# Patient Record
Sex: FEMALE | Race: BLACK OR AFRICAN AMERICAN | NOT HISPANIC OR LATINO | Employment: OTHER | URBAN - METROPOLITAN AREA
[De-identification: names, ages, dates, MRNs, and addresses within clinical notes are randomized per-mention and may not be internally consistent; named-entity substitution may affect disease eponyms.]

---

## 2017-01-10 ENCOUNTER — HOSPITAL ENCOUNTER (OUTPATIENT)
Dept: WOUND CARE | Facility: HOSPITAL | Age: 67
Discharge: HOME | DRG: 592 | End: 2017-01-10
Attending: FAMILY MEDICINE
Payer: MEDICARE

## 2017-01-10 ENCOUNTER — HOSPITAL ENCOUNTER (INPATIENT)
Facility: HOSPITAL | Age: 67
LOS: 3 days | Discharge: HOME HEALTH | DRG: 592 | End: 2017-01-13
Attending: EMERGENCY MEDICINE | Admitting: INTERNAL MEDICINE
Payer: MEDICARE

## 2017-01-10 DIAGNOSIS — L08.9 INFECTED DECUBITUS ULCER, UNSPECIFIED PRESSURE ULCER STAGE: Primary | ICD-10-CM

## 2017-01-10 DIAGNOSIS — L89.90 INFECTED DECUBITUS ULCER, UNSPECIFIED PRESSURE ULCER STAGE: Primary | ICD-10-CM

## 2017-01-10 DIAGNOSIS — G30.0 ALZHEIMER'S DISEASE WITH EARLY ONSET: ICD-10-CM

## 2017-01-10 DIAGNOSIS — L89.329 PRESSURE ULCER OF LEFT BUTTOCK, UNSPECIFIED STAGE: ICD-10-CM

## 2017-01-10 DIAGNOSIS — F02.80 ALZHEIMER'S DISEASE WITH EARLY ONSET: ICD-10-CM

## 2017-01-10 DIAGNOSIS — L89.314 PRESSURE ULCER OF RIGHT BUTTOCK, STAGE 4: ICD-10-CM

## 2017-01-10 LAB
ALBUMIN SERPL BCP-MCNC: 1.8 G/DL
ALP SERPL-CCNC: 70 U/L
ALT SERPL W/O P-5'-P-CCNC: 23 U/L
ANION GAP SERPL CALC-SCNC: 12 MMOL/L
ANISOCYTOSIS BLD QL SMEAR: SLIGHT
AST SERPL-CCNC: 42 U/L
BASOPHILS # BLD AUTO: 0.02 K/UL
BASOPHILS NFR BLD: 0.2 %
BILIRUB SERPL-MCNC: 0.4 MG/DL
BUN SERPL-MCNC: 13 MG/DL
CALCIUM SERPL-MCNC: 8.7 MG/DL
CHLORIDE SERPL-SCNC: 95 MMOL/L
CO2 SERPL-SCNC: 29 MMOL/L
CREAT SERPL-MCNC: 0.6 MG/DL
DIFFERENTIAL METHOD: ABNORMAL
EOSINOPHIL # BLD AUTO: 0 K/UL
EOSINOPHIL NFR BLD: 0 %
ERYTHROCYTE [DISTWIDTH] IN BLOOD BY AUTOMATED COUNT: 15.6 %
EST. GFR  (AFRICAN AMERICAN): >60 ML/MIN/1.73 M^2
EST. GFR  (NON AFRICAN AMERICAN): >60 ML/MIN/1.73 M^2
GLUCOSE SERPL-MCNC: 74 MG/DL
HCT VFR BLD AUTO: 35.2 %
HGB BLD-MCNC: 11.1 G/DL
LYMPHOCYTES # BLD AUTO: 0.9 K/UL
LYMPHOCYTES NFR BLD: 7 %
MCH RBC QN AUTO: 27.1 PG
MCHC RBC AUTO-ENTMCNC: 31.5 %
MCV RBC AUTO: 86 FL
MONOCYTES # BLD AUTO: 0.9 K/UL
MONOCYTES NFR BLD: 7 %
NEUTROPHILS # BLD AUTO: 10.9 K/UL
NEUTROPHILS NFR BLD: 86.2 %
PLATELET # BLD AUTO: 360 K/UL
PMV BLD AUTO: 9.7 FL
POLYCHROMASIA BLD QL SMEAR: ABNORMAL
POTASSIUM SERPL-SCNC: 4.9 MMOL/L
PROT SERPL-MCNC: 7.6 G/DL
RBC # BLD AUTO: 4.1 M/UL
SODIUM SERPL-SCNC: 136 MMOL/L
WBC # BLD AUTO: 12.65 K/UL

## 2017-01-10 PROCEDURE — 87186 SC STD MICRODIL/AGAR DIL: CPT | Mod: 59

## 2017-01-10 PROCEDURE — 96367 TX/PROPH/DG ADDL SEQ IV INF: CPT

## 2017-01-10 PROCEDURE — 87040 BLOOD CULTURE FOR BACTERIA: CPT

## 2017-01-10 PROCEDURE — 80053 COMPREHEN METABOLIC PANEL: CPT

## 2017-01-10 PROCEDURE — 63600175 PHARM REV CODE 636 W HCPCS: Performed by: EMERGENCY MEDICINE

## 2017-01-10 PROCEDURE — 99285 EMERGENCY DEPT VISIT HI MDM: CPT | Mod: 25,27

## 2017-01-10 PROCEDURE — 87070 CULTURE OTHR SPECIMN AEROBIC: CPT

## 2017-01-10 PROCEDURE — 25000003 PHARM REV CODE 250: Performed by: EMERGENCY MEDICINE

## 2017-01-10 PROCEDURE — 12000002 HC ACUTE/MED SURGE SEMI-PRIVATE ROOM

## 2017-01-10 PROCEDURE — 96366 THER/PROPH/DIAG IV INF ADDON: CPT

## 2017-01-10 PROCEDURE — 85025 COMPLETE CBC W/AUTO DIFF WBC: CPT

## 2017-01-10 PROCEDURE — 96365 THER/PROPH/DIAG IV INF INIT: CPT

## 2017-01-10 PROCEDURE — 99214 OFFICE O/P EST MOD 30 MIN: CPT | Mod: ,,, | Performed by: FAMILY MEDICINE

## 2017-01-10 PROCEDURE — 87077 CULTURE AEROBIC IDENTIFY: CPT | Mod: 59

## 2017-01-10 RX ORDER — SODIUM CHLORIDE 9 MG/ML
INJECTION, SOLUTION INTRAVENOUS CONTINUOUS
Status: DISCONTINUED | OUTPATIENT
Start: 2017-01-10 | End: 2017-01-13 | Stop reason: HOSPADM

## 2017-01-10 RX ORDER — MIRTAZAPINE 15 MG/1
15 TABLET, FILM COATED ORAL NIGHTLY
Status: DISCONTINUED | OUTPATIENT
Start: 2017-01-10 | End: 2017-01-13

## 2017-01-10 RX ORDER — SODIUM CHLORIDE 9 MG/ML
1000 INJECTION, SOLUTION INTRAVENOUS
Status: COMPLETED | OUTPATIENT
Start: 2017-01-10 | End: 2017-01-10

## 2017-01-10 RX ORDER — MEMANTINE HYDROCHLORIDE 10 MG/1
10 TABLET ORAL 2 TIMES DAILY
Status: DISCONTINUED | OUTPATIENT
Start: 2017-01-10 | End: 2017-01-13 | Stop reason: HOSPADM

## 2017-01-10 RX ORDER — LEVETIRACETAM 500 MG/1
500 TABLET ORAL 2 TIMES DAILY
Status: DISCONTINUED | OUTPATIENT
Start: 2017-01-10 | End: 2017-01-11

## 2017-01-10 RX ADMIN — PIPERACILLIN SODIUM AND TAZOBACTAM SODIUM 4.5 G: 4; .5 INJECTION, POWDER, FOR SOLUTION INTRAVENOUS at 09:01

## 2017-01-10 RX ADMIN — LEVETIRACETAM 500 MG: 500 TABLET, FILM COATED ORAL at 11:01

## 2017-01-10 RX ADMIN — MIRTAZAPINE 15 MG: 15 TABLET, FILM COATED ORAL at 11:01

## 2017-01-10 RX ADMIN — SODIUM CHLORIDE 1000 ML: 0.9 INJECTION, SOLUTION INTRAVENOUS at 09:01

## 2017-01-10 RX ADMIN — VANCOMYCIN HYDROCHLORIDE 750 MG: 750 INJECTION, POWDER, LYOPHILIZED, FOR SOLUTION INTRAVENOUS at 09:01

## 2017-01-10 RX ADMIN — SODIUM CHLORIDE: 0.9 INJECTION, SOLUTION INTRAVENOUS at 11:01

## 2017-01-10 RX ADMIN — MEMANTINE 10 MG: 10 TABLET ORAL at 11:01

## 2017-01-10 NOTE — ED TRIAGE NOTES
Pt arrives via EMS from wound care center for wound evaluation for new wound in addition to existing ulcers to sacrum which EMS reports wound care center reported pt may need surgery. Hx of dementia.

## 2017-01-10 NOTE — IP AVS SNAPSHOT
[address] [place] [address]  Phone: [phone]           I have received a copy of my After Visit Summary and discharge instructions from [facility].    INSTRUCTIONS RECEIVED AND UNDERSTOOD BY:                     Patient/Patient Representative: ________________________________________________________________     Date/Time: ________________________________________________________________                     Instructions Given By: ________________________________________________________________     Date/Time: ________________________________________________________________

## 2017-01-10 NOTE — IP AVS SNAPSHOT
[place] [address] [place] [address]  Phone: [phone]           Patient Discharge Instructions     Our goal is to set you up for success. This packet includes information on your condition, medications, and your home care. It will help you to care for yourself so you don't get sicker and need to go back to the hospital.     Please ask your nurse if you have any questions.        There are many details to remember when preparing to leave the hospital. Here is what you will need to do:    1. Take your medicine. If you are prescribed medications, review your Medication List in the following pages. You may have new medications to  at the pharmacy and others that you'll need to stop taking. Review the instructions for how and when to take your medications. Talk with your doctor or nurses if you are unsure of what to do.     2. Go to your follow-up appointments. Specific follow-up information is listed in the following pages. Your may be contacted by a transition nurse or clinical provider about future appointments. Be sure we have all of the phone numbers to reach you, if needed. Please contact your provider's office if you are unable to make an appointment.     3. Watch for warning signs. Your doctor or nurse will give you detailed warning signs to watch for and when to call for assistance. These instructions may also include educational information about your condition. If you experience any of warning signs to your health, call your doctor.               ** Verify the list of medication(s) below is accurate and up to date. Carry this with you in case of emergency. If your medications have changed, please notify your healthcare provider.             Medication List      START taking these medications        Additional Info                      amoxicillin-clavulanate 875-125mg 875-125 mg per tablet   Commonly known as:  AUGMENTIN   Quantity:  28 tablet   Refills:  0    Dose:  1 tablet   Indications:  Skin & soft tissue    Instructions:  1 tablet by Per G Tube route every 12 (twelve) hours.     Begin Date    AM    Noon    PM    Bedtime       collagenase ointment   Quantity:  90 g   Refills:  0    Last time this was given:  1/13/2017 11:28 AM   Instructions:  Apply topically once daily.     Begin Date    AM    Noon    PM    Bedtime       linezolid 600 mg Tab   Commonly known as:  ZYVOX   Quantity:  28 tablet   Refills:  0   Dose:  600 mg   Indications:  Skin & soft tissue    Instructions:  1 tablet (600 mg total) by Per G Tube route every 12 (twelve) hours.     Begin Date    AM    Noon    PM    Bedtime         CONTINUE taking these medications        Additional Info                      donepezil 10 MG tablet   Commonly known as:  ARICEPT   Quantity:  30 tablet   Refills:  5   Dose:  10 mg    Instructions:  Take 1 tablet (10 mg total) by mouth every evening.     Begin Date    AM    Noon    PM    Bedtime       gel base no.41 (bulk) Gel   Quantity:  500 g   Refills:  11   Dose:  1 application   Comments:  Hydrogel or the equivalent    Instructions:  1 application by Misc.(Non-Drug; Combo Route) route 4 (four) times daily as needed.     Begin Date    AM    Noon    PM    Bedtime       levetiracetam 500 MG Tab   Commonly known as:  KEPPRA   Quantity:  60 tablet   Refills:  0   Dose:  500 mg    Last time this was given:  500 mg on 1/13/2017 11:28 AM   Instructions:  Take 1 tablet (500 mg total) by mouth 2 (two) times daily.     Begin Date    AM    Noon    PM    Bedtime       levothyroxine 75 MCG tablet   Commonly known as:  SYNTHROID   Quantity:  30 tablet   Refills:  5   Dose:  75 mcg    Last time this was given:  75 mcg on 1/13/2017  7:23 AM   Instructions:  Take 1 tablet (75 mcg total) by mouth before breakfast.     Begin Date    AM    Noon    PM    Bedtime       memantine 10 MG Tab   Commonly known as:  NAMENDA   Quantity:  60 tablet   Refills:  5   Dose:  10 mg    Last time this  was given:  10 mg on 1/13/2017 11:28 AM   Instructions:  Take 1 tablet (10 mg total) by mouth 2 (two) times daily.     Begin Date    AM    Noon    PM    Bedtime       triamcinolone acetonide 0.5% 0.5 % Crea   Commonly known as:  KENALOG   Quantity:  30 g   Refills:  0    Instructions:  Apply a thin film to affected area twice daily for 7-14 days     Begin Date    AM    Noon    PM    Bedtime         STOP taking these medications     escitalopram oxalate 20 MG tablet   Commonly known as:  LEXAPRO       mirtazapine 15 MG tablet   Commonly known as:  REMERON            Where to Get Your Medications      These medications were sent to [facility] [id number] [place] [address] [address] [facility] & [place] [address] [address] [facility] [address]    Hours:  24-hours Phone:  [phone]     amoxicillin-clavulanate 875-125mg 875-125 mg per tablet    collagenase ointment    linezolid 600 mg Tab         Information about where to get these medications is not yet available     ! Ask your nurse or doctor about these medications     levetiracetam 500 MG Tab                  Please bring to all follow up appointments:    1. A copy of your discharge instructions.  2. All medicines you are currently taking in their original bottles.  3. Identification and insurance card.    Please arrive 15 minutes ahead of scheduled appointment time.    Please call 24 hours in advance if you must reschedule your appointment and/or time.        Your Scheduled Appointments     Jan 20, 2017 10:00 AM [facility]   Hospital Follow Up with Azikiwe K. Lombard, MD   [facility] ([facility]    [address] [place] [id number] [phone]              Follow-up Information     Follow up with Azikiwe K Lombard, MD On 1/20/2017.    Specialty:  Family Medicine    Why:  Outpatient Services, PCP follow-up appointment. Patient should arrive by 10:00AM.     Contact information:    [address]  [place] [address]  [phone]          Follow up with [facility].    Specialties:  DME Provider, Home Health Services    Why:  Home Health    Contact information:    [place] [id number] [phone]          Follow up with [facility] In 1 day.    Why:  Outpatient Services,Palliative Care     Contact information:    [facility]        Discharge Instructions     Future Orders    Activity as tolerated     Diet general     Comments:    NPO. Tube feedings.    Questions:    Total calories:      Fat restriction, if any:      Protein restriction, if any:      Na restriction, if any:      Fluid restriction:      Additional restrictions:          Primary Diagnosis     Your primary diagnosis was:  Bedsore      Admission Information     Date & Time Provider Department CSN    1/10/2017  5:22 PM Danyell Jones MD [facility] [id number]      Care Providers     Provider Role Specialty Primary office phone    Danyell Jones MD Attending Provider Hospitalist [phone]    Gerson Coronado MD Consulting Physician  General Surgery [phone]    Jayne Alejandro MD Consulting Physician  Infectious Diseases [phone]      Important Medicare Message          Most Recent Value    Important Message from Medicare Regarding Discharge Appeal Rights  Given to patient/caregiver, Explained to patient/caregiver, Signed/date by patient/caregiver yes 01/12/2017 1402      Your Vitals Were     BP Pulse Temp Resp    106/59 (BP Location: Left arm, Patient Position: Lying, BP Method: Automatic) 84 98.5 °F (36.9 °C) (Axillary) 18    Height Weight SpO2 BMI    5' 9" (1.753 m) 44.5 kg (98 lb 1.6 oz) 99% 14.49 kg/m2      Recent Lab Values     No lab values to display.      Pending Labs     Order Current Status    Aerobic culture (Specify Source) **CANNOT BE ORDERED AS STAT** Preliminary result    Blood culture Preliminary result    Blood culture Preliminary result    "   Allergies as of 1/13/2017     No Known Allergies      [facility]     [facility] Nurse Care Line - 24/7 Assistance  Unless otherwise directed by your provider, please contact [facility], our nurse care line that is available for 24/7 assistance.     Registered nurses in the [facility] provide clinical advisement, health education, appointment booking, and other advisory services.  Call for this free service at [phone].        Advance Directives     An advance directive is a document which, in the event you are no longer able to make decisions for yourself, tells your healthcare team what kind of treatment you do or do not want to receive, or who you would like to make those decisions for you.  If you do not currently have an advance directive, [facility] encourages you to create one.  For more information call:  [phone]), [phone] ([phone]),  or log on to [email or web address].        Language Assistance Services     ATTENTION: Language assistance services are available, free of charge. Please call [phone].      ATENCIÓN: Si habla español, tiene a gallardo disposición servicios gratuitos de asistencia lingüística. Llame al [phone].     CHÚ Ý: N?u b?n nói Ti?ng Vi?t, có các d?ch v? h? tr? ngôn ng? mi?n phí dành cho b?n. G?i s? [phone].        Chronic Kindey Disease Education             [facility] Sign-Up     Activating your [facility] account is as easy as 1-2-3!     1) Visit [email or web address], select Sign Up Now, enter this activation code and your date of birth, then select Next.  [email or web address]  Expires: 2/25/2017  9:43 AM      2) Create a username and password to use when you visit [facility] in the future and select a security question in case you lose your password and select Next.    3) Enter your e-mail address and click Sign Up!    Additional Information  If you have questions, please e-mail [email or web address] or call [phone]  to talk to our [facility] staff. Remember, [facility] is NOT to be used for urgent needs. For medical emergencies, dial 911.          [facility] complies with applicable Federal civil rights laws and does not discriminate on the basis of race, color, national origin, age, disability, or sex.

## 2017-01-10 NOTE — ED PROVIDER NOTES
Encounter Date: 1/10/2017    SCRIBE #1 NOTE: I, Jose Carlos Cline, am scribing for, and in the presence of,  Srikanth Landeros MD. I have scribed the following portions of the note - Other sections scribed: HPI, ROS.       History     Chief Complaint   Patient presents with    Wound Infection     arrived via [facility], called to wound care for transport to ER for wound evaluation, sacrum wound not healing,      Review of patient's allergies indicates:  No Known Allergies  HPI Comments: CC: Wound Check    HPI: 66 year old female with a history of dementia presents to the ED via EMS accompanied by her daughter who reports the patient has a wound to her sacrum which seems to be getting worse over the last few days. Daughter reports this wound has been draining malodorous purulent discharge for several days. She states the patient was evaluated by urgent care earlier today and the staff was concerned that the patient may need emergent intervention. Daughter otherwise denies fever, chills, abdominal pain, nausea, diarrhea, and any changes in the patient's urinary habits at this time. Symptoms are constant. Of note, history is otherwise limited due to the patient's history of dementia.    The history is provided by the patient, a relative and the EMS personnel.     Past Medical History   Diagnosis Date    Alzheimer disease     Cancer      thyroid    Chronic kidney disease, stage III (moderate)     Dyslipidemia     Essential hypertension, benign     History of thyroid cancer     Hypothyroidism     Obesity     Seizure disorder     Seizures     Unspecified vitamin D deficiency      No past medical history pertinent negatives.  Past Surgical History   Procedure Laterality Date    Thyroidectomy      Gastrostomy tube placement       Family History   Problem Relation Age of Onset    Hypertension Daughter     Alzheimer's disease Mother     Alzheimer's disease       grandmother    Alzheimer's disease       aunt      Social History   Substance Use Topics    Smoking status: Never Smoker    Smokeless tobacco: Never Used    Alcohol use No     Review of Systems   Unable to perform ROS: Psychiatric disorder   Constitutional: Negative for chills and fever.   Eyes: Negative for visual disturbance.   Respiratory: Negative for shortness of breath.    Cardiovascular: Negative for chest pain.   Gastrointestinal: Negative for abdominal pain, diarrhea and vomiting.   Skin: Positive for wound.       Physical Exam   Initial Vitals   BP Pulse Resp Temp SpO2   01/10/17 1650 01/10/17 1650 01/10/17 1650 01/10/17 1650 01/10/17 1650   96/52 94 20 97.3 °F (36.3 °C) 94 %     Physical Exam    Nursing note and vitals reviewed.  Constitutional: She appears well-developed. She is not diaphoretic. No distress.   Cachectic   HENT:   Head: Normocephalic and atraumatic.   Nose: Nose normal.   Mouth/Throat: Oropharynx is clear and moist. No oropharyngeal exudate.   Eyes: Conjunctivae and EOM are normal. Pupils are equal, round, and reactive to light. No scleral icterus.   Neck: Normal range of motion. Neck supple. No thyromegaly present. No tracheal deviation present.   Cardiovascular: Normal rate, regular rhythm and normal heart sounds. Exam reveals no gallop and no friction rub.    No murmur heard.  Pulmonary/Chest: Breath sounds normal. No respiratory distress. She has no wheezes. She has no rhonchi. She has no rales.   Abdominal: Soft. Bowel sounds are normal. She exhibits no distension and no mass. There is no tenderness. There is no rebound and no guarding.   Musculoskeletal: Normal range of motion. She exhibits no edema or tenderness.   Lymphadenopathy:     She has no cervical adenopathy.   Neurological: She is alert. No cranial nerve deficit or sensory deficit.   Skin: Skin is warm and dry. No rash noted. No erythema. No pallor.              ED Course   Procedures  Labs Reviewed   CBC W/ AUTO DIFFERENTIAL - Abnormal; Notable for the following:         Result Value    Hemoglobin 11.1 (*)     Hematocrit 35.2 (*)     MCHC 31.5 (*)     RDW 15.6 (*)     Platelets 360 (*)     Gran # 10.9 (*)     Lymph # 0.9 (*)     Gran% 86.2 (*)     Lymph% 7.0 (*)     All other components within normal limits   COMPREHENSIVE METABOLIC PANEL - Abnormal; Notable for the following:     Albumin 1.8 (*)     AST 42 (*)     All other components within normal limits   CULTURE, AEROBIC  (SPECIFY SOURCE)             Medical Decision Making:   Initial Assessment:   66-year-old female with advanced dementia, chronic kidney disease, high blood pressure, chronic decubitus ulcers brought in by EMS, who were called by the patient's wound care physician, who had concerns for a worsening infected left buttock decubitus ulceration.  EMS reports patient's vital signs and mental status are stable.  Examination reveals several decubitus ulcerations as detailed above, with one clearly infected, foul-smelling, large ulceration to the left buttock that will require debridement and antibiotics.   Differential Diagnosis:   Wound infection.   ED Management:  Patient's laboratory evaluation is significant for borderline elevated white blood cell count with a granulocytic predominance, that is higher than patient's baseline.  She does also have severe hypoalbuminemia.  Given the rapid progression of patient's L buttock decubitus ulceration with abundance of necrotic tissue, foul smell, purulent drainage, her severe hypoalbuminemia, and her borderline elevated white blood cell count I do not believe outpatient management is appropriate and I will admit for surgical debridement, IV antibiotic.             Scribe Attestation:   Scribe #1: I performed the above scribed service and the documentation accurately describes the services I performed. I attest to the accuracy of the note.    Attending Attestation:           Physician Attestation for Scribe:  Physician Attestation Statement for Scribe #1: Srikanth ELIZABETH  MD Tigre, reviewed documentation, as scribed by Jose Carlos Cline in my presence, and it is both accurate and complete.                 ED Course     Clinical Impression:   The encounter diagnosis was Infected decubitus ulcer, unspecified pressure ulcer stage.          Srikanth Landeros III, MD  01/11/17 7286

## 2017-01-11 PROBLEM — R64 CACHEXIA: Status: ACTIVE | Noted: 2017-01-11

## 2017-01-11 LAB
ANION GAP SERPL CALC-SCNC: 10 MMOL/L
BASOPHILS # BLD AUTO: 0.01 K/UL
BASOPHILS NFR BLD: 0.1 %
BUN SERPL-MCNC: 11 MG/DL
CALCIUM SERPL-MCNC: 8.6 MG/DL
CHLORIDE SERPL-SCNC: 101 MMOL/L
CO2 SERPL-SCNC: 25 MMOL/L
CREAT SERPL-MCNC: 0.6 MG/DL
DIFFERENTIAL METHOD: ABNORMAL
EOSINOPHIL # BLD AUTO: 0 K/UL
EOSINOPHIL NFR BLD: 0.1 %
ERYTHROCYTE [DISTWIDTH] IN BLOOD BY AUTOMATED COUNT: 15.7 %
EST. GFR  (AFRICAN AMERICAN): >60 ML/MIN/1.73 M^2
EST. GFR  (NON AFRICAN AMERICAN): >60 ML/MIN/1.73 M^2
GLUCOSE SERPL-MCNC: 55 MG/DL
HCT VFR BLD AUTO: 33 %
HGB BLD-MCNC: 10.3 G/DL
LYMPHOCYTES # BLD AUTO: 1.1 K/UL
LYMPHOCYTES NFR BLD: 7 %
MCH RBC QN AUTO: 27.3 PG
MCHC RBC AUTO-ENTMCNC: 31.2 %
MCV RBC AUTO: 88 FL
MONOCYTES # BLD AUTO: 3 K/UL
MONOCYTES NFR BLD: 19.4 %
NEUTROPHILS # BLD AUTO: 11.2 K/UL
NEUTROPHILS NFR BLD: 73.7 %
PLATELET # BLD AUTO: 408 K/UL
PMV BLD AUTO: 9.7 FL
POTASSIUM SERPL-SCNC: 4.5 MMOL/L
RBC # BLD AUTO: 3.77 M/UL
SODIUM SERPL-SCNC: 136 MMOL/L
WBC # BLD AUTO: 15.22 K/UL

## 2017-01-11 PROCEDURE — 85025 COMPLETE CBC W/AUTO DIFF WBC: CPT

## 2017-01-11 PROCEDURE — 36415 COLL VENOUS BLD VENIPUNCTURE: CPT

## 2017-01-11 PROCEDURE — 12000002 HC ACUTE/MED SURGE SEMI-PRIVATE ROOM

## 2017-01-11 PROCEDURE — 25000003 PHARM REV CODE 250: Performed by: EMERGENCY MEDICINE

## 2017-01-11 PROCEDURE — 25000003 PHARM REV CODE 250: Performed by: HOSPITALIST

## 2017-01-11 PROCEDURE — 63600175 PHARM REV CODE 636 W HCPCS: Performed by: HOSPITALIST

## 2017-01-11 PROCEDURE — 80048 BASIC METABOLIC PNL TOTAL CA: CPT

## 2017-01-11 RX ORDER — ONDANSETRON 2 MG/ML
4 INJECTION INTRAMUSCULAR; INTRAVENOUS EVERY 12 HOURS PRN
Status: DISCONTINUED | OUTPATIENT
Start: 2017-01-11 | End: 2017-01-11

## 2017-01-11 RX ORDER — LEVETIRACETAM 5 MG/ML
500 INJECTION INTRAVASCULAR EVERY 12 HOURS
Status: DISCONTINUED | OUTPATIENT
Start: 2017-01-11 | End: 2017-01-11

## 2017-01-11 RX ORDER — LEVOTHYROXINE SODIUM 75 UG/1
75 TABLET ORAL
Status: DISCONTINUED | OUTPATIENT
Start: 2017-01-11 | End: 2017-01-13 | Stop reason: HOSPADM

## 2017-01-11 RX ORDER — ACETAMINOPHEN 325 MG/1
650 TABLET ORAL EVERY 6 HOURS PRN
Status: DISCONTINUED | OUTPATIENT
Start: 2017-01-11 | End: 2017-01-13 | Stop reason: HOSPADM

## 2017-01-11 RX ORDER — LEVETIRACETAM 500 MG/1
500 TABLET ORAL 2 TIMES DAILY
Status: DISCONTINUED | OUTPATIENT
Start: 2017-01-11 | End: 2017-01-13 | Stop reason: HOSPADM

## 2017-01-11 RX ORDER — ESCITALOPRAM OXALATE 10 MG/1
20 TABLET ORAL DAILY
Status: DISCONTINUED | OUTPATIENT
Start: 2017-01-11 | End: 2017-01-13

## 2017-01-11 RX ADMIN — SODIUM CHLORIDE: 0.9 INJECTION, SOLUTION INTRAVENOUS at 11:01

## 2017-01-11 RX ADMIN — PIPERACILLIN SODIUM AND TAZOBACTAM SODIUM 4.5 G: 4; .5 INJECTION, POWDER, FOR SOLUTION INTRAVENOUS at 05:01

## 2017-01-11 RX ADMIN — ESCITALOPRAM OXALATE 20 MG: 10 TABLET ORAL at 04:01

## 2017-01-11 RX ADMIN — MEMANTINE 10 MG: 10 TABLET ORAL at 04:01

## 2017-01-11 NOTE — CONSULTS
[facility]  Surgery Consult    Subjective:     Reason for consultation: Sacral decubitus    History of Present Illness:   Patient is a 66 y.o. admitted after recent wound care evaluation concern for infection involving chronic sacral decubitus ulcers.  Patient with history of severe dementia associated with Alzheimer's.  History obtained primarily from patient's  present at bedside.  By report the patient is nonverbal and bedbound for extended period of time.  Surgical evaluation is being obtained to evaluate for possible debridement of presumed infected sacral decubitus wound.        Patient Active Problem List   Diagnosis    Malnutrition    Hypothyroidism    Unspecified vitamin D deficiency    Dyslipidemia    Depression    Seizure disorder    History of thyroid cancer    History of thyroidectomy    Dementia of the Alzheimer's type    Decrease in appetite    Hypernatremia    Dehydration    Essential hypertension    Epilepsy    Pressure ulcer of right buttock, stage 4    Multiple open wounds of lower extremity    PEG tube malfunction    Infected decubitus ulcer          No current facility-administered medications on file prior to encounter.      Current Outpatient Prescriptions on File Prior to Encounter   Medication Sig Dispense Refill    escitalopram oxalate (LEXAPRO) 20 MG tablet Take 1 tablet (20 mg total) by mouth once daily. 30 tablet 5    levetiracetam (KEPPRA) 500 MG Tab Take 1 tablet (500 mg total) by mouth 2 (two) times daily. 60 tablet 5    levothyroxine (SYNTHROID) 75 MCG tablet Take 1 tablet (75 mcg total) by mouth before breakfast. 30 tablet 5    memantine (NAMENDA) 10 MG Tab Take 1 tablet (10 mg total) by mouth 2 (two) times daily. 60 tablet 5    mirtazapine (REMERON) 15 MG tablet Take 1 tablet (15 mg total) by mouth every evening. 30 tablet 0    donepezil (ARICEPT) 10 MG tablet Take 1 tablet (10 mg total) by mouth every evening. 30 tablet 5    gel base  no.41, bulk, Gel 1 application by Misc.(Non-Drug; Combo Route) route 4 (four) times daily as needed. 500 g 11    triamcinolone acetonide 0.5% (KENALOG) 0.5 % Crea Apply a thin film to affected area twice daily for 7-14 days 30 g 0        Review of patient's allergies indicates:  No Known Allergies       Past Medical History   Diagnosis Date    Alzheimer disease     Cancer      thyroid    Chronic kidney disease, stage III (moderate)     Dyslipidemia     Essential hypertension, benign     History of thyroid cancer     Hypothyroidism     Seizure disorder     Seizures     Unspecified vitamin D deficiency           Past Surgical History   Procedure Laterality Date    Thyroidectomy      Gastrostomy tube placement            Family History   Problem Relation Age of Onset    Hypertension Daughter     Alzheimer's disease Mother     Alzheimer's disease       grandmother    Alzheimer's disease       aunt          Social History     Social History    Marital status: Legally      Spouse name: N/A    Number of children: N/A    Years of education: N/A     Occupational History    Not on file.     Social History Main Topics    Smoking status: Never Smoker    Smokeless tobacco: Never Used    Alcohol use No    Drug use: No    Sexual activity: Not Currently     Other Topics Concern    Not on file     Social History Narrative         Review of Systems:  Unable to obtain     Physical Exam:    Vitals:    01/11/17 0800   BP: 134/76   Pulse: 93   Resp: 16   Temp: 98.5 °F (36.9 °C)       General: Nonverbal, Frail, cachectic   Respiratory: Respirations are non-labored, Symmetrical chest wall expansion, No chest wall tenderness.   Cardiovascular: Normal rate, Regular rhythm   Gastrointestinal: Soft, Non-tender, Non-distended, PEG tube in place    Musculoskeletal:Bilateral upper and lower extremity contractures.   Integumentary:Pressure sore involving the right ear.  Bilateral buttock sacral decubitus wounds  present with bone palpable.  Right sided wound with beefy red tissue and purulent fluid within the wound bed.  Left buttock wound with foul-smelling black eschar with purulent drainage.  Incontinence of stool near the wounds.       Data Review:  CBC:   Recent Labs  Lab 01/10/17  1955   WBC 12.65   RBC 4.10   HGB 11.1*   HCT 35.2*   *   MCV 86   MCH 27.1   MCHC 31.5*     CMP:   Recent Labs  Lab 01/10/17  1955   GLU 74   CALCIUM 8.7   ALBUMIN 1.8*   PROT 7.6      K 4.9   CO2 29   CL 95   BUN 13   CREATININE 0.6   ALKPHOS 70   ALT 23   AST 42*   BILITOT 0.4     Microbiology Results (last 7 days)     Procedure Component Value Units Date/Time    Blood culture [[id number]] Collected:  01/10/17 1955    Order Status:  Completed Specimen:  Blood from Peripheral, Hand, Left Updated:  01/11/17 0312     Blood Culture, Routine No Growth to date    Blood culture [[id number]] Collected:  01/10/17 1918    Order Status:  Completed Specimen:  Blood from Peripheral, Hand, Left Updated:  01/11/17 0312     Blood Culture, Routine No Growth to date    Aerobic culture (Specify Source) **CANNOT BE ORDERED AS STAT** [[id number]] Collected:  01/10/17 1900    Order Status:  Sent Specimen:  Decubitus from Buttocks, Left Updated:  01/10/17 1912          ASSESSMENT/PLAN:     66-year-old female with severe dementia and chronically bedbound now with progressively worsening bilateral buttock sacral decubitus wounds.  Wounds appear to be likely chronically contaminated with fecal incontinence.  Left buttock eschar present with foul-smelling drainage.  Right-sided eschar with healthier wound appearance but a purulent drainage as well.  Patient severely debilitated and these wounds will never heal despite our best efforts and intentions.  Long discussion with the  present at bedside.  I do not recommend aggressive surgical debridement in this situation given the entire clinical picture.  Strongly suggest they consider hospice  management.  All questions have been answered.  The  will consider their options but likely wish to avoid any aggressive measures.  Case discussed directly with Dr. Jones.

## 2017-01-11 NOTE — PLAN OF CARE
Problem: Pressure Ulcer Risk (Raúl Scale) (Adult,Obstetrics,Pediatric)  Intervention: Promote/Optimize Nutrition    01/11/17 0628   Nutrition Interventions   Oral Nutrition Promotion safe use of adaptive equipment encouraged       Intervention: Prevent/Manage Excess Moisture    01/11/17 0628 01/11/17 1309   Skin Interventions   Skin Protection skin-to-skin areas padded;transparent dressing maintained;tubing/devices free from skin contact --    Hygiene Care   Perineal Care absorbent pad changed;diaper changed;perineum cleansed --    Bathing/Skin Care --  incontinence care       Intervention: Maintain Head of Bed Elevation Less Than 30 Degrees as Tolerated    01/11/17 1210   Positioning   Head of Bed (HOB) HOB at 30 degrees       Intervention: Prevent/Minimize Sheer/Friction Injuries    01/11/17 0628   Skin Interventions   Pressure Reduction Techniques weight shift assistance provided   Positioning   Positioning/Transfer Devices pillows       Intervention: Turn/Reposition Often    01/11/17 0628 01/11/17 1210   Skin Interventions   Pressure Reduction Techniques weight shift assistance provided --    Positioning   Body Position --  side-lying, right         Goal: Identify Related Risk Factors and Signs and Symptoms  Related risk factors and signs and symptoms are identified upon initiation of Human Response Clinical Practice Guideline (CPG)   Outcome: Ongoing (interventions implemented as appropriate)    01/11/17 0628   Pressure Ulcer Risk (Raúl Scale)   Related Risk Factors (Pressure Ulcer Risk (Raúl Scale)) cognitive impairment;infection;mobility impaired;mental impairment;nutritional deficiencies;skeletal deformities;mechanical forces;medical devices       Goal: Skin Integrity  Patient will demonstrate the desired outcomes by discharge/transition of care.   Outcome: Ongoing (interventions implemented as appropriate)    01/11/17 1309   Pressure Ulcer Risk (Raúl Scale) (Adult,Obstetrics,Pediatric)   Skin  Integrity making progress toward outcome         Problem: Infection, Risk/Actual (Adult)  Intervention: Manage Suspected/Actual Infection    01/11/17 1309   Safety Interventions   Infection Management aseptic technique maintained   Prevent/Manage Colorectal Surgical Infection   Fever Reduction/Comfort Measures lightweight bedding       Intervention: Prevent Infection/Maximize Resistance    01/11/17 1309   Hygiene Care   Bathing/Skin Care incontinence care   Respiratory Interventions   Airway/Ventilation Management airway patency maintained;calming measures promoted   Pain/Comfort/Sleep Interventions   Sleep/Rest Enhancement awakenings minimized;family presence promoted;noise level reduced;regular sleep/rest pattern promoted         Goal: Identify Related Risk Factors and Signs and Symptoms  Related risk factors and signs and symptoms are identified upon initiation of Human Response Clinical Practice Guideline (CPG)  Outcome: Ongoing (interventions implemented as appropriate)    01/11/17 1309   Infection, Risk/Actual   Related Risk Factors (Infection, Risk/Actual) chronic illness/condition;malnutrition;skin integrity impairment;tissue perfusion altered   Signs and Symptoms (Infection, Risk/Actual) lab value changes       Goal: Infection Prevention/Resolution  Patient will demonstrate the desired outcomes by discharge/transition of care.  Outcome: Ongoing (interventions implemented as appropriate)    01/11/17 1309   Infection, Risk/Actual (Adult)   Infection Prevention/Resolution making progress toward outcome

## 2017-01-11 NOTE — PLAN OF CARE
01/11/17 1453   Discharge Assessment   Assessment Type Discharge Planning Assessment   Confirmed/corrected address and phone number on facesheet? Yes   Assessment information obtained from? Caregiver   If Healthcare Directive is received, is it scanned into Epic? no (comment)   Prior to hospitilization cognitive status: Unable to Assess   Prior to hospitalization functional status: Completely Dependent   Current cognitive status: Unable to Assess   Current Functional Status: Completely Dependent   Arrived From home or self-care   Lives With child(sushil), adult;grandchild(sushil);spouse   Able to Return to Prior Arrangements yes   Is patient able to care for self after discharge? Yes   How many people do you have in your home that can help with your care after discharge? 3   Who are your caregiver(s) and their phone number(s)? Erin pt daughter [phone]   Patient's perception of discharge disposition home health   Readmission Within The Last 30 Days no previous admission in last 30 days   Patient currently being followed by outpatient case management? No   Patient currently receives home health services? Yes   Does the patient currently use HME? Yes   Patient currently receives private duty nursing? Yes   How many hours of private duty nursing does the patient receive? 28   Patient currently receives any other outside agency services? No   Equipment Currently Used at Home hospital bed  (Per caregiver she was prescribe a arun lit but it could not fit.)   Do you have any problems affording any of your prescribed medications? No   Is the patient taking medications as prescribed? yes   Do you have any financial concerns preventing you from receiving the healthcare you need? No   Does the patient have transportation to healthcare appointments? Yes   Transportation Available family or friend will provide   On Dialysis? No   Does the patient receive services at the Coumadin Clinic? No   Are there any open cases? No    Discharge Plan A Home with family;Home Health   Patient/Family In Agreement With Plan yes     SW informed by pt daughter Erin pt has Neftaly  and receives 32 hrs a week with a sitter from [facility]. Per Erin sitter name is Juana and she has recently started.     [facility] [address] [place] [address] [address] [facility] [place] [address] [place] [id number]  [facility] [place] [address]  Phone: [phone] Fax: [phone]

## 2017-01-11 NOTE — PLAN OF CARE
Problem: Nutrition, Enteral (Adult)  Goal: Signs and Symptoms of Listed Potential Problems Will be Absent, Minimized or Managed (Nutrition, Enteral)  Signs and symptoms of listed potential problems will be absent, minimized or managed by discharge/transition of care (reference Nutrition, Enteral (Adult) CPG).  Recommendations  Recommendation/Intervention: 1.) Recommend Nutren 2.0 @ 10 mls/hr advancing by 10 mls Q6 hrs to goal rate 35 mls/hr continuous to provide 1680 kcals/day, 71 g protein/day, 181 g CHO/day, and 581 mls water/day. Hold TF x4 hrs for residuals >250 mls. Flush 180 mls Q4 hrs to meet fluid needs or per MD.  Or Bolus feed Nutren 2.0 x4 cartons/day (8am, 12pm, 4pm, 8pm) to provide 2000 kcals/day, 84 g protein/day, 216 g CHO/day, and 692 mls water/day. Check for gastric residuals prior to administering feeds and hold x4 hrs for residuals >250 mls. Flush 180 mls Q4 hrs to meet fluid needs per MD.   Goals: 1.) Patient will recieve nutrition in 48 hrs.   Nutrition Goal Status: new  Communication of RD Recs: (sticky note)

## 2017-01-11 NOTE — PROGRESS NOTES
Notified Dr. Jones that pt was not assigned a sx for sx consult. Orders to assign Dr. Coronado noted. Will continue to monitor.

## 2017-01-11 NOTE — ED NOTES
Pt lying in bed resting with eyes closed. Rise and fall of chest noted symmetrically. Family and  currently at bedside. No signs of distress noted at this time. Will cont to monitor pt

## 2017-01-11 NOTE — CONSULTS
[facility]  Adult Nutrition  Consult Note    SUMMARY     Recommendations  Recommendation/Intervention: 1.) Recommend Nutren 2.0 @ 10 mls/hr advancing by 10 mls Q6 hrs to goal rate 35 mls/hr continuous to provide 1680 kcals/day, 71 g protein/day, 181 g CHO/day, and 581 mls water/day. Hold TF x4 hrs for residuals >250 mls. Flush 180 mls Q4 hrs to meet fluid needs or per MD.  Or Bolus feed Nutren 2.0 x4 cartons/day (8am, 12pm, 4pm, 8pm) to provide 2000 kcals/day, 84 g protein/day, 216 g CHO/day, and 692 mls water/day. Check for gastric residuals prior to administering feeds and hold x4 hrs for residuals >250 mls. Flush 180 mls Q4 hrs to meet fluid needs per MD.   Goals: 1.) Patient will recieve nutrition in 48 hrs.   Nutrition Goal Status: new  Communication of RD Recs:  (sticky note)    1. Infected decubitus ulcer, unspecified pressure ulcer stage      Past Medical History   Diagnosis Date    Alzheimer disease     Cancer      thyroid    Chronic kidney disease, stage III (moderate)     Dyslipidemia     Essential hypertension, benign     History of thyroid cancer     Hypothyroidism     Seizure disorder     Seizures     Unspecified vitamin D deficiency        Reason for Assessment  Reason for Assessment: nurse/nurse practitioner consult  General Information Comments: Admits for evaluation of new wound with already existing ulcers.   Per chart, patient nonverbal and disoriented.      Nutrition Prescription Ordered  Current Diet Order: NPO      Evaluation of Received Nutrients/Fluid Intake  IV Fluid (mL): 2400     Nutrition Risk Screen  Nutrition Risk Screen: tube feeding or parenteral nutrition, large or nonhealing wound, burn or pressure ulcer, dysphagia or difficulty swallowing    Nutrition/Diet History  Factors Affecting Nutritional Intake: NPO    Labs/Tests/Procedures/Meds  Diagnostic Test/Procedure Review: reviewed  Pertinent Labs Reviewed: reviewed, pertinent  BMP  Lab Results    Component Value Date     01/10/2017    K 4.9 01/10/2017    CL 95 01/10/2017    CO2 29 01/10/2017    BUN 13 01/10/2017    CREATININE 0.6 01/10/2017    CALCIUM 8.7 01/10/2017    ANIONGAP 12 01/10/2017    ESTGFRAFRICA >60 01/10/2017    EGFRNONAA >60 01/10/2017     Lab Results   Component Value Date    ALBUMIN 1.8 (L) 01/10/2017     Lab Results   Component Value Date    CALCIUM 8.7 01/10/2017    PHOS 1.9 (L) 08/31/2016     No results for input(s): POCTGLUCOSE in the last 24 hours.    Pertinent Medications Reviewed: reviewed  Scheduled Meds:   escitalopram oxalate  20 mg Oral Daily    levetiracetam  500 mg Oral BID    levothyroxine  75 mcg Oral Before breakfast    memantine  10 mg Oral BID    mirtazapine  15 mg Oral QHS    vancomycin (VANCOCIN) IVPB  15 mg/kg Intravenous Q24H     Continuous Infusions:   sodium chloride 0.9% 100 mL/hr at 01/10/17 2310         Physical Findings  Overall Physical Appearance: underweight  Tubes: gastrostomy tube  Skin: pressure ulcer(s) (stage II. Raúl score 8)    Anthropometrics  Height (inches): 69.02 in  Weight Method: Bed Scale  Weight (kg): 44.6 kg  Ideal Body Weight (IBW), Female: 145.1 lb  % Ideal Body Weight, Female (lb): 67.77 lb  BMI (kg/m2): 14.51  BMI Grade: less than 16 protein-energy malnutrition grade III      Estimated/Assessed Needs  Weight Used For Calorie Calculations: 44.6 kg (98 lb 5.2 oz)   Height (cm): 175.3 cm  35 kcal/kg (kcal): 1561 and 40 kcal/kg (kcal): 1784 (weight gain)  RMR ([place]-St. Jeor Equation): 1065.3   Weight Used For Protein Calculations: 44.6 kg (98 lb 5.2 oz)  1.2 gm Protein (gm): 53.63 and 2.0 gm Protein (gm): 89.39 (weight gain)   Fluid Need Method: RDA Method (Or PER MD)   Grams of CHO per day: not required      Malnutrition (Undernutrition) Diagnosis  % Intake of Estimated Energy Needs: 0 - 25%  % Meal Intake: NPO    Nutrition Diagnosis  Nutrition Problem: Inadequate energy intake  Etiology/Related To: decreased ability to  consume sufficient energy  Nutrition Diagnosis Signs/Symptoms As Evidenced By: BMI <16  Nutrition Diagnosis Status: New    Monitor and Evaluation  Food and Nutrient Intake: energy intake, enteral nutrition intake  Food and Nutrient Adminstration: diet order, enteral and parenteral nutrition administration  Anthropometric Measurements: weight, weight change, body mass index  Biochemical Data, Medical Tests and Procedures: electrolyte and renal panel, lipid profile, glucose/endocrine profile  Nutrition-Focused Physical Findings: overall appearance    Nutrition Risk  Level of Risk:  (x2 weekly)     Discharge planning: discharge on peg feeds above.     Nutrition Follow-Up  RD Follow-up?: Yes   1/13/17

## 2017-01-11 NOTE — ED NOTES
SKIN ASSESSMENT:  Left hip: 2 x 2 cm stage 2 pressure ulcer on left hip; mepilex dressing placed  Left buttock: 10 x 12 cm stage 4 pressure ulcer with black necrotic tissue surrounding perimeter; 3 areas of tunneling; 3 4x4 saline soaked gauze dressing packed in tunneled area of wound; non adherent dressing applied on top; large mepelix dressing applied  Right hip/thigh area: 5 x 7 cm stage 4 pressure ulcer; redness; no drainage; 4 cm tunneling; 3 4x4 saline soaked gauzes packed into wound; nonadherent dressing placed on top; large mepilex applied   Left heel: 5 x 4 cm stage 3 pressure ulcer on left heel with epidermis hanging and yellow drainage; nonadherent dressing and mepilex dressing applied  Lateral side of angle of Right foot: 2 x 3 cm area of black necrotic tissue; unstageable; mepilex dressing applied

## 2017-01-11 NOTE — PROGRESS NOTES
Patient has peg tub that is currently clamped. Unable to withdraw gastric contents to check pH and air bolus check was unsuccessful also to confirm placement of tube in the stomach. MD notified, meds held for now.

## 2017-01-11 NOTE — PLAN OF CARE
Problem: Fall Risk (Adult)  Intervention: Monitor/Assist with Self Care    17   Activity   Activity Assistance Provided assistance, 2 people       Intervention: Reduce Risk/Promote Restraint Free Environment    17   Prevent [place] Drop/Fall   Safety/Security Measures bed alarm set   Safety Interventions   Safety Precautions emergency equipment at bedside   Safety Interventions   Environmental Safety Modification room organization consistent;clutter free environment maintained;room near unit station       Intervention: Review Medications/Identify Contributors to Fall Risk    17   Safety Interventions   Medication Review/Management medications reviewed       Intervention: Patient Rounds    17   Safety Interventions   Patient Rounds bed in low position;bed wheels locked;call light in reach;clutter free environment maintained;ID band on;placement of personal items at bedside;toileting offered;visualized patient       Intervention: Safety Promotion/Fall Prevention    17   Safety Interventions   Safety Promotion/Fall Prevention assistive device/personal item within reach;bed alarm set;Fall Risk reviewed with patient/family;lighting adjusted         Goal: Identify Related Risk Factors and Signs and Symptoms  Related risk factors and signs and symptoms are identified upon initiation of Human Response Clinical Practice Guideline (CPG)   Outcome: Ongoing (interventions implemented as appropriate)    17   Fall Risk   Related Risk Factors (Fall Risk) age-related changes;environment unfamiliar;confusion/agitation   Signs and Symptoms (Fall Risk) presence of risk factors       Goal: Absence of Falls  Patient will demonstrate the desired outcomes by discharge/transition of care.   Outcome: Ongoing (interventions implemented as appropriate)    17   Fall Risk (Adult)   Absence of Falls making progress toward outcome         Problem: Pressure Ulcer Risk  (Raúl Scale) (Adult,Obstetrics,Pediatric)  Intervention: Promote/Optimize Nutrition    01/11/17 0628   Nutrition Interventions   Oral Nutrition Promotion safe use of adaptive equipment encouraged       Intervention: Prevent/Manage Excess Moisture    01/11/17 0628   Skin Interventions   Skin Protection skin-to-skin areas padded;transparent dressing maintained;tubing/devices free from skin contact   Hygiene Care   Perineal Care absorbent pad changed;diaper changed;perineum cleansed       Intervention: Maintain Head of Bed Elevation Less Than 30 Degrees as Tolerated    01/11/17 0600   Positioning   Head of Bed (HOB) HOB at 30 degrees       Intervention: Prevent/Minimize Sheer/Friction Injuries    01/11/17 0628   Skin Interventions   Pressure Reduction Techniques weight shift assistance provided   Positioning   Positioning/Transfer Devices pillows         Goal: Identify Related Risk Factors and Signs and Symptoms  Related risk factors and signs and symptoms are identified upon initiation of Human Response Clinical Practice Guideline (CPG)   Outcome: Ongoing (interventions implemented as appropriate)    01/11/17 0628   Pressure Ulcer Risk (Raúl Scale)   Related Risk Factors (Pressure Ulcer Risk (Raúl Scale)) cognitive impairment;infection;mobility impaired;mental impairment;nutritional deficiencies;skeletal deformities;mechanical forces;medical devices       Goal: Skin Integrity  Patient will demonstrate the desired outcomes by discharge/transition of care.   Outcome: Ongoing (interventions implemented as appropriate)    01/11/17 0628   Pressure Ulcer Risk (Raúl Scale) (Adult,Obstetrics,Pediatric)   Skin Integrity making progress toward outcome         Problem: Patient Care Overview  Goal: Plan of Care Review  Outcome: Ongoing (interventions implemented as appropriate)    01/11/17 0628   Coping/Psychosocial   Plan Of Care Reviewed With spouse

## 2017-01-11 NOTE — PHARMACY MED REC
[facility] Medication Reconciliation  Template    Patient was admitted on 1/10/2017 for <principal problem not specified>.      Patient's prior to admission medication regimen was as follows:  Prescriptions Prior to Admission   Medication Sig Dispense Refill Last Dose    escitalopram oxalate (LEXAPRO) 20 MG tablet Take 1 tablet (20 mg total) by mouth once daily. 30 tablet 5 1/9/2017    levetiracetam (KEPPRA) 500 MG Tab Take 1 tablet (500 mg total) by mouth 2 (two) times daily. 60 tablet 5 1/10/2017    levothyroxine (SYNTHROID) 75 MCG tablet Take 1 tablet (75 mcg total) by mouth before breakfast. 30 tablet 5 1/10/2017    memantine (NAMENDA) 10 MG Tab Take 1 tablet (10 mg total) by mouth 2 (two) times daily. 60 tablet 5 1/10/2017    mirtazapine (REMERON) 15 MG tablet Take 1 tablet (15 mg total) by mouth every evening. 30 tablet 0 1/9/2017    donepezil (ARICEPT) 10 MG tablet Take 1 tablet (10 mg total) by mouth every evening. 30 tablet 5 8/29/2016 at Unknown time    gel base no.41, bulk, Gel 1 application by Misc.(Non-Drug; Combo Route) route 4 (four) times daily as needed. 500 g 11     triamcinolone acetonide 0.5% (KENALOG) 0.5 % Crea Apply a thin film to affected area twice daily for 7-14 days 30 g 0 8/28/2016         Please add appropriate    SmartPhrase below:      The home medication history was taken by Guido Dukes Pharm.D..  Based on information gathered and subsequent review by the clinical pharmacist, there were no discrepancies in what has been ordered in EPIC.    Thank you,   Guido Dukes Pharm.D.

## 2017-01-11 NOTE — PROGRESS NOTES
Received patient from ER to room via stretcher. Patient accompanied by transport and spouse. Transferred patient to bed w/ assist. Evaluated general patient appearance and condition. Admit assessment initiated. Pt Medsurg, No tele monitoring initiated. Pt does not respond verbally; pt confused, not oriented to place, self, or situation. Multpile wound dressings in place, C/D/I. Saline lock at RW #24 and NS infusing @ LH # 20 clean, dry, Intact. Pt smells strongly of urine but incontinent diaper in clean, dry, and intact. No apparent distress noted at this time. Pt appears to be resting calmly. Will continue to monitor.

## 2017-01-12 PROBLEM — Z66 DNR (DO NOT RESUSCITATE): Status: ACTIVE | Noted: 2017-01-12

## 2017-01-12 LAB
ANION GAP SERPL CALC-SCNC: 7 MMOL/L
BASOPHILS # BLD AUTO: 0.01 K/UL
BASOPHILS NFR BLD: 0.1 %
BUN SERPL-MCNC: 9 MG/DL
CALCIUM SERPL-MCNC: 8.3 MG/DL
CHLORIDE SERPL-SCNC: 102 MMOL/L
CO2 SERPL-SCNC: 28 MMOL/L
CREAT SERPL-MCNC: 0.6 MG/DL
DIFFERENTIAL METHOD: ABNORMAL
EOSINOPHIL # BLD AUTO: 0 K/UL
EOSINOPHIL NFR BLD: 0.3 %
ERYTHROCYTE [DISTWIDTH] IN BLOOD BY AUTOMATED COUNT: 15.7 %
EST. GFR  (AFRICAN AMERICAN): >60 ML/MIN/1.73 M^2
EST. GFR  (NON AFRICAN AMERICAN): >60 ML/MIN/1.73 M^2
GLUCOSE SERPL-MCNC: 78 MG/DL
HCT VFR BLD AUTO: 31.3 %
HGB BLD-MCNC: 10 G/DL
LYMPHOCYTES # BLD AUTO: 0.9 K/UL
LYMPHOCYTES NFR BLD: 9.2 %
MCH RBC QN AUTO: 27.5 PG
MCHC RBC AUTO-ENTMCNC: 31.9 %
MCV RBC AUTO: 86 FL
MONOCYTES # BLD AUTO: 0.8 K/UL
MONOCYTES NFR BLD: 8 %
NEUTROPHILS # BLD AUTO: 8.2 K/UL
NEUTROPHILS NFR BLD: 82.9 %
PLATELET # BLD AUTO: 492 K/UL
PMV BLD AUTO: 10 FL
POTASSIUM SERPL-SCNC: 4.2 MMOL/L
RBC # BLD AUTO: 3.63 M/UL
SODIUM SERPL-SCNC: 137 MMOL/L
WBC # BLD AUTO: 9.89 K/UL

## 2017-01-12 PROCEDURE — 25000003 PHARM REV CODE 250: Performed by: EMERGENCY MEDICINE

## 2017-01-12 PROCEDURE — 25000003 PHARM REV CODE 250: Performed by: HOSPITALIST

## 2017-01-12 PROCEDURE — 36415 COLL VENOUS BLD VENIPUNCTURE: CPT

## 2017-01-12 PROCEDURE — 63600175 PHARM REV CODE 636 W HCPCS: Performed by: HOSPITALIST

## 2017-01-12 PROCEDURE — 85025 COMPLETE CBC W/AUTO DIFF WBC: CPT

## 2017-01-12 PROCEDURE — 12000002 HC ACUTE/MED SURGE SEMI-PRIVATE ROOM

## 2017-01-12 PROCEDURE — 80048 BASIC METABOLIC PNL TOTAL CA: CPT

## 2017-01-12 PROCEDURE — 25000003 PHARM REV CODE 250: Performed by: INTERNAL MEDICINE

## 2017-01-12 RX ADMIN — COLLAGENASE SANTYL: 250 OINTMENT TOPICAL at 10:01

## 2017-01-12 RX ADMIN — MIRTAZAPINE 15 MG: 15 TABLET, FILM COATED ORAL at 12:01

## 2017-01-12 RX ADMIN — PIPERACILLIN SODIUM AND TAZOBACTAM SODIUM 4.5 G: 4; .5 INJECTION, POWDER, FOR SOLUTION INTRAVENOUS at 12:01

## 2017-01-12 RX ADMIN — PIPERACILLIN SODIUM AND TAZOBACTAM SODIUM 4.5 G: 4; .5 INJECTION, POWDER, FOR SOLUTION INTRAVENOUS at 10:01

## 2017-01-12 RX ADMIN — MEMANTINE 10 MG: 10 TABLET ORAL at 10:01

## 2017-01-12 RX ADMIN — PIPERACILLIN SODIUM AND TAZOBACTAM SODIUM 4.5 G: 4; .5 INJECTION, POWDER, FOR SOLUTION INTRAVENOUS at 05:01

## 2017-01-12 RX ADMIN — MEMANTINE 10 MG: 10 TABLET ORAL at 12:01

## 2017-01-12 RX ADMIN — LEVETIRACETAM 500 MG: 500 TABLET, FILM COATED ORAL at 10:01

## 2017-01-12 RX ADMIN — MIRTAZAPINE 15 MG: 15 TABLET, FILM COATED ORAL at 10:01

## 2017-01-12 RX ADMIN — SODIUM CHLORIDE: 0.9 INJECTION, SOLUTION INTRAVENOUS at 10:01

## 2017-01-12 RX ADMIN — LEVETIRACETAM 500 MG: 500 TABLET, FILM COATED ORAL at 12:01

## 2017-01-12 RX ADMIN — ESCITALOPRAM OXALATE 20 MG: 10 TABLET ORAL at 10:01

## 2017-01-12 NOTE — PROGRESS NOTES
Progress Note    Admit Date: 1/10/2017   LOS: 2 days     SUBJECTIVE:       Nursing report BELLE overnight    OBJECTIVE:       Vital Signs Range (Last 24H):  Temp:  [96.6 °F (35.9 °C)-98.6 °F (37 °C)]   Pulse:  [83-89]   Resp:  [16-17]   BP: ()/(50-74)   SpO2:  [94 %-100 %]     I & O (Last 24H):  Intake/Output Summary (Last 24 hours) at 01/12/17 0957  Last data filed at 01/12/17 0651   Gross per 24 hour   Intake              450 ml   Output                0 ml   Net              450 ml         Physical Exam:  NAD, mental status unchanged    Laboratory:  CBC:   Recent Labs  Lab 01/12/17  0903   WBC 9.89   RBC 3.63*   HGB 10.0*   HCT 31.3*   *   MCV 86   MCH 27.5   MCHC 31.9*     BMP:   Recent Labs  Lab 01/12/17  0903   GLU 78      K 4.2      CO2 28   BUN 9   CREATININE 0.6   CALCIUM 8.3*           ASSESSMENT/PLAN:     Unsalvageable bilateral sacral decubitus ulcers    Extensive conversation with the patient's daughter,  and sister.  I do not think surgery has any role in this situation as this is an unsalvageable situation.  I have recommended transition to palliative care and/or hospice.  Case also d/w wound care team.  Will attempt ongoing local wound care to assist with odor.  Case also d/w palliative care nurse whom will see patient and family today.

## 2017-01-12 NOTE — PROGRESS NOTES
Suction hooked up to wall for pt comfort/safety. NAD noted. Pt resting calmly. Will continue to monitor.

## 2017-01-12 NOTE — SUBJECTIVE & OBJECTIVE
Interval History: Pt with no acute events overnight, tolerating tube feedings.    Review of Systems   Unable to perform ROS: Dementia     Objective:     Vital Signs (Most Recent):  Temp: 97.7 °F (36.5 °C) (01/12/17 1135)  Pulse: 90 (01/12/17 1135)  Resp: 16 (01/12/17 1135)  BP: 127/71 (01/12/17 1135)  SpO2: 97 % (01/12/17 1135) Vital Signs (24h Range):  Temp:  [96.6 °F (35.9 °C)-98 °F (36.7 °C)] 97.7 °F (36.5 °C)  Pulse:  [83-90] 90  Resp:  [16-17] 16  SpO2:  [94 %-100 %] 97 %  BP: ()/(50-74) 127/71     Weight: 44.5 kg (98 lb 1.6 oz)  Body mass index is 14.49 kg/(m^2).    Intake/Output Summary (Last 24 hours) at 01/12/17 1505  Last data filed at 01/12/17 0651   Gross per 24 hour   Intake              450 ml   Output                0 ml   Net              450 ml      Physical Exam   Constitutional:   Cachetic and minimally responsive, non-verbal.   HENT:   Head: Normocephalic and atraumatic.   Eyes: EOM are normal. Pupils are equal, round, and reactive to light.   Neck: Normal range of motion. Neck supple.   Cardiovascular: Normal rate and regular rhythm.    Pulmonary/Chest: Breath sounds normal.   Poor effort.   Abdominal:   Scaphoid, PEG in place, NT, ND, +BS   Musculoskeletal:   Contracted with wasted extremities.   Neurological:   Non-verbal, does not follow commands.   Skin:   Left buttock unstageable; right buttock at stage 4 with foul odor and discharge. Both heels with eschar, both lateral malleolus with unstageable decubituis.       Significant Labs: All pertinent labs within the past 24 hours have been reviewed.    Significant Imaging: I have reviewed and interpreted all pertinent imaging results/findings within the past 24 hours.

## 2017-01-12 NOTE — PLAN OF CARE
Problem: Patient Care Overview  Goal: Plan of Care Review  Outcome: Ongoing (interventions implemented as appropriate)    01/11/17 2000   Coping/Psychosocial   Plan Of Care Reviewed With spouse;daughter;family       No falls this shift. Safety maintained. IVF @ 50 mL/hr and zosyn ivpb scheduled. Tube feedings going @ 20 mL/hr now for goal rate of 30 mL/hr. No residuals. Pt resting calmly and quietly w/ no apparent distress or discomfort observed. Family at bedside. Will continue to monitor.

## 2017-01-12 NOTE — PROGRESS NOTES
PRN wound care done on L and R buttock and L hip wounds. Copious, odorous serosanguinous dranaige noted. Cleansed w/ soap & water and then NS and packed wet to dry then covered w/ mepilex. Pt tolerated well. NAD noted at this time. Will continue to monitor.

## 2017-01-12 NOTE — ASSESSMENT & PLAN NOTE
"Long standing decubiti with possible infection with extremely poor prognosis for healing despite everyone's best efforts. Appreciate ID and surgery input, and I agree that surgical debridement would not be beneficial in this cachetic patient with end stage dementia. I have discussed this with the family and they (daughter with POA) expressed understanding that the patient will not likely heal, but she would still like to pursue surgery for the "comfort" of the patient. Dr. Coronado had another family conference with the family and clarified in detail that the patient is not a surgical candidate. Wound care ordered. Pt is hospice appropriate, but family not ready but will place consult to palliative care to begin education and discussions. Short course of antibiotics as per ID.  "

## 2017-01-12 NOTE — CONSULTS
A 66 year old female readmitted 1/10/17 from home with infected pressure injury; pressure injury Stage 4 right buttock; multiple open wounds lower extremity; dementia of Alzheimer's type; cachexia; hypothyroidism; essential hypertension; epilepsy  PMH: Alzheimer's disease; thyroid cancer; CKD Stage 3; pressure injury Stage 4; dyslipidemia; HTN; hypothyroidism; seizure disorder; Vit D deficiency; S/P PEG  Patient followed in [facility]. [facility] also following.   1/12 WBC 9.89 Hgb 10.0 Hct 31.3     1/10 Alb 1.8  Discussed wounds with Dr. Coronado. Unsalvageable pressure ulcer and will not perform surgical intervention. Palliative Care Nurse to visit again. Palliative Care visited 9/1/16.  On Isoflex mattress; contractures- knees scissoring; incontinent; dependent bed mobility; positioned with foam wedge; wearing Sundance boots  Assessment:  Photodocumentation    Left hip and sacrum- Left hip ulcer unstageable 1 cm(L) 1 cm(W) with slough; Sacrum- Stage 4 10 cm(L) 12 cm(W) 1 cm(D) with necrotic roof. Undermines 2 cm from 11-3 o'clock. Able to feel jagged bone in base of wound. Necrotic roof covering 80% of wound. Large amount foul smelling brown drainage.     Right hip- Stage 4 ulcer 6 cm(L) 4.5 cm(W) 3 cm(D) with 8 cm undermining from 12-3 o'clock. Large amount brown drainage with mildly foul odor. Wound with pink base. Able to feel jagged bone in base of wound.     Right posterior heel and lateral ankle- Unstageable wounds. Heel wound evolving to eschar 6 cm(L) 6 cm(W). Lateral ankle ankle 2 cm(L) 2 cm(W). No drainage or surrounding erythema.     Left posterior heel and lateral ankle- Heel Stage 2- 5 cm(L) 3 cm(W) with pink base and scant serous drainage. Lateral ankle ulcer DTI 1.5 cm(L) 1 cm(W).     Right ear pressure ulcers- Full thickness 4 mm areas of breakdown.   Wounds assessed with Palliative Care nurse and visited with daughter. Discussed treatment plan.   Treatment:  Local wound care to  sacrum with Santyl and NS gauze. Local wound care to bilateral hips and left heel with hydrogel + gauze dressings.   Local wound care to right foot with dry gauze.   Continue Pressure Injury Prevention Interventions with frequent pressure shifts.

## 2017-01-12 NOTE — H&P
[facility] Hospital Medicine  History & Physical    Patient Name: Mercy Pineda  MRN: [id number]  Admission Date: 1/10/2017  Attending Physician: Danyell Jones MD   Primary Care Provider: Azikiwe K Lombard, MD         Patient information was obtained from relative(s), caregiver / friend, past medical records and ER records.     Subjective:     Principal Problem: Decubit infection    Chief Complaint:  Decubiti     HPI: 65 y/o female with end stage Alzheimer, cachexia, chronic stage IV decubiti who was brought in by daughter for foul smelling necrotic decubitus with drainage. Pt has been getting wound care and has a hospital bed with a gel overlay and frequent turning at home. Despite all these measure with tube feedings, her wound continued to to get worse. She is nonverbal, contracted and bed bound at her baseline.    Past Medical History   Diagnosis Date    Alzheimer disease     Cancer      thyroid    Chronic kidney disease, stage III (moderate)     Decubitus ulcer of sacral region, stage 4     Dyslipidemia     Essential hypertension, benign     History of thyroid cancer     Hypothyroidism     Seizure disorder     Seizures     Unspecified vitamin D deficiency        Past Surgical History   Procedure Laterality Date    Thyroidectomy      Gastrostomy tube placement         Review of patient's allergies indicates:  No Known Allergies    No current facility-administered medications on file prior to encounter.      Current Outpatient Prescriptions on File Prior to Encounter   Medication Sig    escitalopram oxalate (LEXAPRO) 20 MG tablet Take 1 tablet (20 mg total) by mouth once daily.    levetiracetam (KEPPRA) 500 MG Tab Take 1 tablet (500 mg total) by mouth 2 (two) times daily.    levothyroxine (SYNTHROID) 75 MCG tablet Take 1 tablet (75 mcg total) by mouth before breakfast.    memantine (NAMENDA) 10 MG Tab Take 1 tablet (10 mg total) by mouth 2 (two) times daily.    mirtazapine  (REMERON) 15 MG tablet Take 1 tablet (15 mg total) by mouth every evening.    donepezil (ARICEPT) 10 MG tablet Take 1 tablet (10 mg total) by mouth every evening.    gel base no.41, bulk, Gel 1 application by Misc.(Non-Drug; Combo Route) route 4 (four) times daily as needed.    triamcinolone acetonide 0.5% (KENALOG) 0.5 % Crea Apply a thin film to affected area twice daily for 7-14 days     Family History     Problem Relation (Age of Onset)    Alzheimer's disease Mother, ,     Hypertension Daughter        Social History Main Topics    Smoking status: Never Smoker    Smokeless tobacco: Never Used    Alcohol use No    Drug use: No    Sexual activity: Not Currently     Review of Systems   Unable to perform ROS: Dementia     Objective:     Vital Signs (Most Recent):  Temp: 96.6 °F (35.9 °C) (01/11/17 1600)  Pulse: 89 (01/11/17 1600)  Resp: 16 (01/11/17 1600)  BP: 127/72 (01/11/17 1600)  SpO2: (!) 94 % (01/11/17 1600) Vital Signs (24h Range):  Temp:  [96.6 °F (35.9 °C)-98.6 °F (37 °C)] 96.6 °F (35.9 °C)  Pulse:  [] 89  Resp:  [16-17] 16  BP: ()/(54-88) 127/72     Weight: 44.6 kg (98 lb 6.4 oz)  Body mass index is 14.53 kg/(m^2).    Physical Exam   Constitutional:   Cachetic and minimally responsive, non-verbal.   HENT:   Head: Normocephalic and atraumatic.   Eyes: EOM are normal. Pupils are equal, round, and reactive to light.   Neck: Normal range of motion. Neck supple.   Cardiovascular: Normal rate and regular rhythm.    Pulmonary/Chest: Breath sounds normal.   Poor effort.   Abdominal:   Scaphoid, PEG in place, NT, ND, +BS   Musculoskeletal:   Contracted with wasted extremities.   Neurological:   Non-verbal, does not follow commands.   Skin:   Left buttock unstageable; right buttock at stage 4 with foul odor and discharge. Both heels with eschar, both lateral malleolus with unstageable decubituis.        Significant Labs: All pertinent labs within the past 24 hours have been  "reviewed.    Significant Imaging: I have reviewed and interpreted all pertinent imaging results/findings within the past 24 hours.    Assessment/Plan:     Infected decubitus ulcer  Long standing decubiti with possible infection with extremely poor prognosis for healing despite everyone's best efforts. Appreciate ID and surgery input, and I agree that surgical debridement would not be beneficial in this cachetic patient with end stage dementia. I have discussed this with the family and they (daughter with POA) expressed understanding that the patient will not likely heal, but she would still like to pursue surgery for the "comfort" of the patient. I told the daughter that the patient will probably not be a surgical candidate given the high risk with minimal benefit to the patient. I have discussed the case with Dr. Coronado and he will discuss this with the family again. Continue course of antibiotics as per ID. Will consider wound care consult after discussions finalized. Family appears not ready for hospice but will consider palliative care consult to begin education process.    Pressure ulcer of right buttock, stage 4  As discussed above, turn Q2, and maximize nutrition.    Multiple open wounds of lower extremity  As discussed above.    Dementia of the Alzheimer's type  End stage and progressive; continue memantine but not likely of any benefit.    Cachexia  Progressive and unlikely to improve despite tube feedings and best efforts.    Hypothyroidism  Continue levothyroxine.    Essential hypertension  Blood pressure normal on no medication, monitor.    Epilepsy  Continue levetiracetam.    DNR status  Discussed with family.    VTE Risk Mitigation         Ordered     Medium Risk of VTE  Once      01/11/17 0143     Place sequential compression device  Until discontinued      01/11/17 0143     Place CALISTA hose  Until discontinued      01/11/17 0143        Danyell Jones MD  Department of Hospital Medicine   [facility]

## 2017-01-12 NOTE — SUBJECTIVE & OBJECTIVE
Past Medical History   Diagnosis Date    Alzheimer disease     Cancer      thyroid    Chronic kidney disease, stage III (moderate)     Decubitus ulcer of sacral region, stage 4     Dyslipidemia     Essential hypertension, benign     History of thyroid cancer     Hypothyroidism     Seizure disorder     Seizures     Unspecified vitamin D deficiency        Past Surgical History   Procedure Laterality Date    Thyroidectomy      Gastrostomy tube placement         Review of patient's allergies indicates:  No Known Allergies    No current facility-administered medications on file prior to encounter.      Current Outpatient Prescriptions on File Prior to Encounter   Medication Sig    escitalopram oxalate (LEXAPRO) 20 MG tablet Take 1 tablet (20 mg total) by mouth once daily.    levetiracetam (KEPPRA) 500 MG Tab Take 1 tablet (500 mg total) by mouth 2 (two) times daily.    levothyroxine (SYNTHROID) 75 MCG tablet Take 1 tablet (75 mcg total) by mouth before breakfast.    memantine (NAMENDA) 10 MG Tab Take 1 tablet (10 mg total) by mouth 2 (two) times daily.    mirtazapine (REMERON) 15 MG tablet Take 1 tablet (15 mg total) by mouth every evening.    donepezil (ARICEPT) 10 MG tablet Take 1 tablet (10 mg total) by mouth every evening.    gel base no.41, bulk, Gel 1 application by Misc.(Non-Drug; Combo Route) route 4 (four) times daily as needed.    triamcinolone acetonide 0.5% (KENALOG) 0.5 % Crea Apply a thin film to affected area twice daily for 7-14 days     Family History     Problem Relation (Age of Onset)    Alzheimer's disease Mother, ,     Hypertension Daughter        Social History Main Topics    Smoking status: Never Smoker    Smokeless tobacco: Never Used    Alcohol use No    Drug use: No    Sexual activity: Not Currently     Review of Systems   Unable to perform ROS: Dementia     Objective:     Vital Signs (Most Recent):  Temp: 96.6 °F (35.9 °C) (01/11/17 1600)  Pulse: 89 (01/11/17  1600)  Resp: 16 (01/11/17 1600)  BP: 127/72 (01/11/17 1600)  SpO2: (!) 94 % (01/11/17 1600) Vital Signs (24h Range):  Temp:  [96.6 °F (35.9 °C)-98.6 °F (37 °C)] 96.6 °F (35.9 °C)  Pulse:  [] 89  Resp:  [16-17] 16  BP: ()/(54-88) 127/72     Weight: 44.6 kg (98 lb 6.4 oz)  Body mass index is 14.53 kg/(m^2).    Physical Exam   Constitutional:   Cachetic and minimally responsive, non-verbal.   HENT:   Head: Normocephalic and atraumatic.   Eyes: EOM are normal. Pupils are equal, round, and reactive to light.   Neck: Normal range of motion. Neck supple.   Cardiovascular: Normal rate and regular rhythm.    Pulmonary/Chest: Breath sounds normal.   Poor effort.   Abdominal:   Scaphoid, PEG in place, NT, ND, +BS   Musculoskeletal:   Contracted with wasted extremities.   Neurological:   Non-verbal, does not follow commands.   Skin:   Left buttock unstageable; right buttock at stage 4 with foul odor and discharge. Both heels with eschar, both lateral malleolus with unstageable decubituis.        Significant Labs: All pertinent labs within the past 24 hours have been reviewed.    Significant Imaging: I have reviewed and interpreted all pertinent imaging results/findings within the past 24 hours.

## 2017-01-12 NOTE — NURSING
Report received from ADE Acevedo. Patient resting comfortably, no complaints, no acute distress noted.  at bedside.

## 2017-01-12 NOTE — PROGRESS NOTES
Handoff report from ADE Salmon. Evaluated general patient appearance/condition. Patient resting quietly, easily aroused per verbal stimuli. Shift assessment completed. No apparent discomfort observed. Pt resting calmly. No apparent distress noted at this time. Will continue to monitor.

## 2017-01-12 NOTE — CONSULTS
Consult Note  Infectious Disease    Consult Requested By: Danyell Jones MD    Reason for Consult: necrotic buttock decubitus    SUBJECTIVE:     History of Present Illness:  Patient is a 66 y.o. female presents with foul smelling necrotic decubitus.she has battled with decubiti despite a hospital bed, a gel overlay and frequent turning. She was found to have a foul odor and was brought to the hospital. She has been declined surgical debridement as very poor surgical risk. She is contracted and bed bound and nonverbal. Family understands futility of situation but wishes to do what they can to help her. They appear not to be ready for hospice.  Past Medical History   Diagnosis Date    Alzheimer disease     Cancer      thyroid    Chronic kidney disease, stage III (moderate)     Dyslipidemia     Essential hypertension, benign     History of thyroid cancer     Hypothyroidism     Seizure disorder     Seizures     Unspecified vitamin D deficiency      Past Surgical History   Procedure Laterality Date    Thyroidectomy      Gastrostomy tube placement       Family History   Problem Relation Age of Onset    Hypertension Daughter     Alzheimer's disease Mother     Alzheimer's disease       grandmother    Alzheimer's disease       aunt     Social History   Substance Use Topics    Smoking status: Never Smoker    Smokeless tobacco: Never Used    Alcohol use No       Review of patient's allergies indicates:  No Known Allergies     Antibiotics     Start     Stop Route Frequency Ordered    01/10/17 2100  vancomycin 750 mg in dextrose 5 % 250 mL IVPB (ready to mix system)      -- IV Every 24 hours (non-standard times) 01/10/17 2054    01/11/17 1730  piperacillin-tazobactam 4.5 g in dextrose 5 % 100 mL IVPB (ready to mix system)      -- IV Every 8 hours (non-standard times) 01/11/17 1629          Review of Systems:  Review of systems not obtained due to patient factors nonverbal.    OBJECTIVE:     Vital Signs (Most  Recent)  Temp: 96.6 °F (35.9 °C) (01/11/17 1600)  Pulse: 89 (01/11/17 1600)  Resp: 16 (01/11/17 1600)  BP: 127/72 (01/11/17 1600)  SpO2: (!) 94 % (01/11/17 1600)    Temperature Range Min/Max (Last 24H):  Temp:  [96.6 °F (35.9 °C)-98.6 °F (37 °C)]     Physical Exam:  General: cachectic  Eyes: conjunctivae/corneas clear. PERRL..  HENT: Head:normocephalic, atraumatic. Ears:not examined. Nose: Nares normal. Septum midline. Mucosa normal. No drainage or sinus tenderness., no discharge. Throat: lips, mucosa, and tongue normal; teeth and gums normal and no throat erythema.  Neck: supple, symmetrical, trachea midline, no JVD and thyroid not enlarged, symmetric, no tenderness/mass/nodules  Lungs:  clear to auscultation bilaterally and normal respiratory effort  Cardiovascular: Heart: regular rate and rhythm, S1, S2 normal, no murmur, click, rub or gallop. Chest Wall: no tenderness. Extremities: contracted. Pulses: not examined.  Abdomen/Rectal: Abdomen: soft, non-tender non-distented; bowel sounds normal; no masses,  no organomegaly. Rectal: not examined  Skin: lt buttock with foul decubitus- 100% slough. rt buttock decubitus istage 4 but clran. both heels with eschar. rt lateral malleolus with unstageable decubituis. lt malleolus, lateral with DTI    Laboratory:  CBC    Recent Labs  Lab 01/11/17  1000   WBC 15.22*   RBC 3.77*   HGB 10.3*   HCT 33.0*   *     BMP    Recent Labs  Lab 01/11/17  1000   CO2 25   BUN 11   CREATININE 0.6   CALCIUM 8.6*     No results for input(s): COLORU, CLARITYU, SPECGRAV, PHUR, PROTEINUA, GLUCOSEU, BILIRUBINCON, BLOODU, WBCU, RBCU, BACTERIA, MUCUS, NITRITE, LEUKOCYTESUR, UROBILINOGEN, HYALINECASTS in the last 168 hours.  Microbiology Results (last 7 days)     Procedure Component Value Units Date/Time    Aerobic culture (Specify Source) **CANNOT BE ORDERED AS STAT** [[id number]] Collected:  01/10/17 1900    Order Status:  Completed Specimen:  Decubitus from Buttocks, Left Updated:  01/11/17  1115     Aerobic Bacterial Culture --     GRAM NEGATIVE AN  Moderate  Identification and susceptibility pending      Blood culture [[id number]] Collected:  01/10/17 1955    Order Status:  Completed Specimen:  Blood from Peripheral, Hand, Left Updated:  01/11/17 0312     Blood Culture, Routine No Growth to date    Blood culture [[id number]] Collected:  01/10/17 1918    Order Status:  Completed Specimen:  Blood from Peripheral, Hand, Left Updated:  01/11/17 0312     Blood Culture, Routine No Growth to date          Diagnostic Results:  Labs: Reviewed  X-Ray: Reviewed    ASSESSMENT/PLAN:     Active Hospital Problems    Diagnosis  POA    Infected decubitus ulcer [L89.90]  Yes      Resolved Hospital Problems    Diagnosis Date Resolved POA   No resolved problems to display.       1. MULTIPLE DECUBITI IN A PATIENT WITH ADVANCED DEMENTIA  LT BUTTOCK IS THE WORST OF THEM. SUGGEST ENZYMATIC DEBRIDEMENT WITH SANTYL AND ASK QING FIELDS TO SEE. SHE MAY BE ABLE TO REMOVE NECROTIC TISSUE AT BEDSIDE  SHE OBVIOUSLY IS DYING SLOWLY  FAMILY UNABLE TO PROCEED TO HOSPICE BECAUSE THEY SEEM UNABLE TO ACCEPT SITUATION  BEING PEG TUBE FED BUT CACHECTIC  SHORT COURSE OF ANTIBIOTICS  PROGNOSIS IS DISMAL

## 2017-01-12 NOTE — ASSESSMENT & PLAN NOTE
"Long standing decubiti with possible infection with extremely poor prognosis for healing despite everyone's best efforts. Appreciate ID and surgery input, and I agree that surgical debridement would not be beneficial in this cachetic patient with end stage dementia. I have discussed this with the family and they (daughter with POA) expressed understanding that the patient will not likely heal, but she would still like to pursue surgery for the "comfort" of the patient. I told the daughter that the patient will probably not be a surgical candidate given the high risk with minimal benefit to the patient. I have discussed the case with Dr. Coronado and he will discuss this with the family again. Continue course of antibiotics as per ID. Will consider wound care consult after discussions finalized. Family appears not ready for hospice but will consider palliative care consult to begin education process.  "

## 2017-01-12 NOTE — PROGRESS NOTES
[facility] Hospital Medicine  Progress Note    Patient Name: Mercy Pineda  MRN: [id number]  Patient Class: IP- Inpatient   Admission Date: 1/10/2017  Length of Stay: 2 days  Attending Physician: Danyell Jones MD  Primary Care Provider: Azikiwe K Lombard, MD        Subjective:     Principal Problem:<principal problem not specified>    HPI:  65 y/o female with end stage Alzheimer, cachexia, chronic stage IV decubiti who was brought in by daughter for foul smelling necrotic decubitus with drainage. Pt has been getting wound care and has a hospital bed with a gel overlay and frequent turning at home. Despite all these measure with tube feedings, her wound continued to to get worse. She is nonverbal, contracted and bed bound at her baseline.    Hospital Course:  65 y/o female with end stage Alzheimer, cachexia, chronic stage IV decubiti admitted for infected decubiti. Pt on course of antibiotics as per ID but overall prognosis poor and wound unsalvageable. Pt is not a surgical candidate and this was finalized by Surgery with family conference today.  Wound care ordered.     Interval History: Pt with no acute events overnight, tolerating tube feedings.    Review of Systems   Unable to perform ROS: Dementia     Objective:     Vital Signs (Most Recent):  Temp: 97.7 °F (36.5 °C) (01/12/17 1135)  Pulse: 90 (01/12/17 1135)  Resp: 16 (01/12/17 1135)  BP: 127/71 (01/12/17 1135)  SpO2: 97 % (01/12/17 1135) Vital Signs (24h Range):  Temp:  [96.6 °F (35.9 °C)-98 °F (36.7 °C)] 97.7 °F (36.5 °C)  Pulse:  [83-90] 90  Resp:  [16-17] 16  SpO2:  [94 %-100 %] 97 %  BP: ()/(50-74) 127/71     Weight: 44.5 kg (98 lb 1.6 oz)  Body mass index is 14.49 kg/(m^2).    Intake/Output Summary (Last 24 hours) at 01/12/17 1505  Last data filed at 01/12/17 0651   Gross per 24 hour   Intake              450 ml   Output                0 ml   Net              450 ml      Physical Exam   Constitutional:   Cachetic and minimally  "responsive, non-verbal.   HENT:   Head: Normocephalic and atraumatic.   Eyes: EOM are normal. Pupils are equal, round, and reactive to light.   Neck: Normal range of motion. Neck supple.   Cardiovascular: Normal rate and regular rhythm.    Pulmonary/Chest: Breath sounds normal.   Poor effort.   Abdominal:   Scaphoid, PEG in place, NT, ND, +BS   Musculoskeletal:   Contracted with wasted extremities.   Neurological:   Non-verbal, does not follow commands.   Skin:   Left buttock unstageable; right buttock at stage 4 with foul odor and discharge. Both heels with eschar, both lateral malleolus with unstageable decubituis.       Significant Labs: All pertinent labs within the past 24 hours have been reviewed.    Significant Imaging: I have reviewed and interpreted all pertinent imaging results/findings within the past 24 hours.    Assessment/Plan:      Infected decubitus ulcer  Long standing decubiti with possible infection with extremely poor prognosis for healing despite everyone's best efforts. Appreciate ID and surgery input, and I agree that surgical debridement would not be beneficial in this cachetic patient with end stage dementia. I have discussed this with the family and they (daughter with POA) expressed understanding that the patient will not likely heal, but she would still like to pursue surgery for the "comfort" of the patient. Dr. Coronado had another family conference with the family and clarified in detail that the patient is not a surgical candidate. Wound care ordered. Pt is hospice appropriate, but family not ready but will place consult to palliative care to begin education and discussions. Short course of antibiotics as per ID.    Pressure ulcer of right buttock, stage 4  As discussed above, turn Q2, and maximize nutrition.      Multiple open wounds of lower extremity  As discussed above.      Dementia of the Alzheimer's type  End stage and progressive; continue memantine but not likely of any " benefit.      Cachexia  Progressive and unlikely to improve despite tube feedings and best efforts.      Epilepsy  Continue levetiracetam.      Essential hypertension  Blood pressure normal on no medication, monitor.      Hypothyroidism  Continue levothyroxine.      DNR (do not resuscitate)  Discussed with family.      VTE Risk Mitigation         Ordered     Medium Risk of VTE  Once      01/11/17 0143     Place sequential compression device  Until discontinued      01/11/17 0143     Place CALISTA hose  Until discontinued      01/11/17 0143          Danyell Jones MD  Department of Hospital Medicine   [facility]

## 2017-01-12 NOTE — CONSULTS
Consult Note  Palliative Care      Consult Requested By: Danyell Jones MD  Reason for Consult:       End stage dementia, cachexia, stage IV decubitus (multiple)    SUBJECTIVE:     History of Present Illness:    CC: Wound Check     HPI: 66 year old female with a history of dementia presents to the ED via EMS accompanied by her daughter who reports the patient has a wound to her sacrum which seems to be getting worse over the last few days. Daughter reports this wound has been draining malodorous purulent discharge for several days. She states the patient was evaluated by urgent care earlier today and the staff was concerned that the patient may need emergent intervention. Daughter otherwise denies fever, chills, abdominal pain, nausea, diarrhea, and any changes in the patient's urinary habits at this time. Symptoms are constant. Of note, history is otherwise limited due to the patient's history of dementia.     The history is provided by the patient, a relative and the EMS personnel.     Patient was admitted for infected decubitus.  Consult was placed to General Surgery; Dr. Coronado's notes indicate wound is unsalvageable and surgery would be of no benefit.  Consult was also placed to I.D., and Dr. Alejandro's note indicate's patient prognosis is dismal.  Wound Care consulted and is following.    Palliative Care has been consulted to discuss goals of care in this patient with end stage dementia, severe decubitus not amenable to surgical intervention, and severe cachexia.      Past Medical History   Diagnosis Date    Alzheimer disease     Cancer      thyroid    Chronic kidney disease, stage III (moderate)     Decubitus ulcer of sacral region, stage 4     Dyslipidemia     Essential hypertension, benign     History of thyroid cancer     Hypothyroidism     Seizure disorder     Seizures     Unspecified vitamin D deficiency      Past Surgical History   Procedure Laterality Date    Thyroidectomy      Gastrostomy  "tube placement       Family History   Problem Relation Age of Onset    Hypertension Daughter     Alzheimer's disease Mother     Alzheimer's disease       grandmother    Alzheimer's disease       aunt     Social History   Substance Use Topics    Smoking status: Never Smoker    Smokeless tobacco: Never Used    Alcohol use No       Mental Status:  Awake with eyes open, nonverbal, does not follow commands.      Palliative Performance Score:  20+  On tube feedings via PEG.        OBJECTIVE:     Pain Assessment/symptom management:  Patient unable to speak or communicate, severe end stage dementia.  Does clench jaw, furrowed brown and makes jerking motions during wound care, also occasional soft moaning sound.  Otherwise cannot obtain any other info on symptomatology.  See MAR for medication regimen.      Decision-Making Capacity:  Patient is unable to speak or communicate in any way on her own behalf.      Advanced Directives:  Living Will:  [place] dated 9/14/16 is valid.    Do Not Resuscitate Status:   DNR    Medical Power of :   NO       Living Arrangements:    Lives at home with family.      Psychosocial, Spiritual, Cultural: Patient unable to communicate, nonverbal, severe end stage dementia.   Patient's most important priorities:  Patient's biggest concerns/fears:  Previous death/end of life care history:  Patient's goals/hopes:      ASSESSMENT/PLAN:     Patient lying on her side, RN's preparing her for wound care nurse.  Patient appears chronically ill and much older than her stated age, severe cachexia with all bony prominences protruding - no fat and no muscle.  HT 5'9" and WT 44.5 kg/98 lbs, BMI 14.5.  She is awake with eyes open, but her head is bent downward so it is difficult to make eye contact - no eye tracking noted.  She is nonverbal.  Body is contracted, but she does have some small spontaneous movements and when holding her hand she will press her thumb (right) against my hand, but does " "not do this consistently upon commands.      Respiratory effort is even and unlabored, lungs are clear and very diminished throughout.  Heart tones are audible, regular.  Abdomen is scaphoid, + bowel sounds.  No peripheral edema noted.  Patient is severely emaciated.  Wound Care nurse Pilar arrived to do wound care on sacrum - very large wound, foul smelling, she commented she could feel jagged bone in the wound, states eschar too adherent to be taken off (see her notes).  Patient gritting her teeth - can see jaw tense with pain, furrowed brow, and jerking movements.  Bedside RN states will provide medication - only Tylenol ordered for pain.  Vital signs:  Afebrile 98.1, HR 84, /56, RR 18, sat 99% on RA.      Subsequent to above, met with patient's daughter Erin Orellana who is known to me from patient's prior admit in August 2016.      Daughter states she has seen the wounds and can state patient's diagnosis and says "I know she can't get any better", but she does not seem to be able to put this into the context of patient's suffering in light of dismal prognosis.  She lacks insight into quality of life issues.   When explaining about failure to thrive and severe malnutrition, she mentions they have been feeding her well (through PEG), and I once again explained malnutrition process that she will continue to have FTT in spite of having adequate TF, and this will make it impossible for wound to heal.  She was able to relay information provided to her from Dr. Coronado as to why surgery not recommended, and wound not curable. Informed her of wound care nurse's findings of jagged bone in the sacral wound.   Also discussed severe end stage dementia.      We discussed goals of care, and she states she wants her mother "to be comfortable."  But at any mention of hospice or comfort measures she immediately says "everyone keeps talking to me about that.  We don't want that and I am tired of hearing about it and it is " "starting to make me mad."  She does not give any specific reason for not wanting hospice, but says they want to continue to have home health and sitters - the services she has now.  She then says there would be no one at home to help care for patient. She wants to continue wound care and is ok for that to be home based now.   She says she does not "see much pain" for her mother.  I explained her expression is severely limited by the dementia - look for jaw tension, furrowed brow, jerking, etc.   I briefly explained hospice philosophy and services to her, and she does acknowledge that it is becoming more burdensome for patient to come to the hospital.  Of interest, she states her grandmother  at home (Alzheimer's) and did not have hospice care.     We discussed alternative of having home Palliative Care visits by NP with [facility] to help with symptom control.  Daughter is agreeable to this.  Provided information about [facility] NP Jami Damon and [facility] NP Evelyn Mcguire.  She is agreeable to either one.      We discussed code status and she reaffirms DNR.  The LaPOST dated 16 is valid.         Ample time was allowed for discussion of concerns.  All questions were asked and answered to her satisfaction.  Emotional support provided.    Plan:  Educate  Literature.  Goals of care and code status discussion.  Support.  Consult .      Recommendations:   Continue supportive care.   Family refuses hospice.   Family agreeable to home visits by Palliative NP.  (Jami Damon/[facility])   Patient may benefit from scheduled pain medication - especially for wound care!   Consult Spiritual Care.  (done)   Palliative Care will follow as needed.    Thank you for this consult and the opportunity to participate in Mrs. Pineda's care again.    Myrna Almeida, BSN, RN, CCRN   Palliative Care Nurse Coordinator   [facility]  [phone]       Time Spent:  > 90 minutes (50% at bedside)    "

## 2017-01-13 VITALS
BODY MASS INDEX: 14.53 KG/M2 | TEMPERATURE: 99 F | RESPIRATION RATE: 18 BRPM | HEART RATE: 84 BPM | WEIGHT: 98.13 LBS | OXYGEN SATURATION: 99 % | SYSTOLIC BLOOD PRESSURE: 106 MMHG | HEIGHT: 69 IN | DIASTOLIC BLOOD PRESSURE: 59 MMHG

## 2017-01-13 PROCEDURE — 25000003 PHARM REV CODE 250: Performed by: EMERGENCY MEDICINE

## 2017-01-13 PROCEDURE — 25000003 PHARM REV CODE 250: Performed by: HOSPITALIST

## 2017-01-13 PROCEDURE — 63600175 PHARM REV CODE 636 W HCPCS: Performed by: HOSPITALIST

## 2017-01-13 RX ORDER — AMOXICILLIN AND CLAVULANATE POTASSIUM 875; 125 MG/1; MG/1
1 TABLET, FILM COATED ORAL EVERY 12 HOURS
Qty: 28 TABLET | Refills: 0 | Status: SHIPPED | OUTPATIENT
Start: 2017-01-13 | End: 2017-01-27

## 2017-01-13 RX ORDER — AMOXICILLIN AND CLAVULANATE POTASSIUM 875; 125 MG/1; MG/1
1 TABLET, FILM COATED ORAL EVERY 12 HOURS
Status: DISCONTINUED | OUTPATIENT
Start: 2017-01-13 | End: 2017-01-13 | Stop reason: HOSPADM

## 2017-01-13 RX ORDER — LINEZOLID 600 MG/1
600 TABLET, FILM COATED ORAL EVERY 12 HOURS
Status: DISCONTINUED | OUTPATIENT
Start: 2017-01-13 | End: 2017-01-13 | Stop reason: HOSPADM

## 2017-01-13 RX ORDER — LEVETIRACETAM 500 MG/1
500 TABLET ORAL 2 TIMES DAILY
Qty: 60 TABLET | Refills: 0
Start: 2017-01-13 | End: 2017-01-27 | Stop reason: SDUPTHER

## 2017-01-13 RX ORDER — LINEZOLID 600 MG/1
600 TABLET, FILM COATED ORAL EVERY 12 HOURS
Qty: 28 TABLET | Refills: 0 | Status: SHIPPED | OUTPATIENT
Start: 2017-01-13 | End: 2017-01-27

## 2017-01-13 RX ADMIN — LEVOTHYROXINE SODIUM 75 MCG: 75 TABLET ORAL at 07:01

## 2017-01-13 RX ADMIN — ESCITALOPRAM OXALATE 20 MG: 10 TABLET ORAL at 11:01

## 2017-01-13 RX ADMIN — LEVETIRACETAM 500 MG: 500 TABLET, FILM COATED ORAL at 11:01

## 2017-01-13 RX ADMIN — PIPERACILLIN SODIUM AND TAZOBACTAM SODIUM 4.5 G: 4; .5 INJECTION, POWDER, FOR SOLUTION INTRAVENOUS at 11:01

## 2017-01-13 RX ADMIN — COLLAGENASE SANTYL: 250 OINTMENT TOPICAL at 11:01

## 2017-01-13 RX ADMIN — MEMANTINE 10 MG: 10 TABLET ORAL at 11:01

## 2017-01-13 RX ADMIN — PIPERACILLIN SODIUM AND TAZOBACTAM SODIUM 4.5 G: 4; .5 INJECTION, POWDER, FOR SOLUTION INTRAVENOUS at 03:01

## 2017-01-13 NOTE — PROGRESS NOTES
PALLIATIVE CARE PROGRESS NOTE:    With permission of patient's daughter Erin Orellana, I called Deanne with [facility] to give patient's name, MRN, and  and diagnosis to NP Jami Damon for home Palliative Care visits for symptom control and continued counseling for hospice care.      Discussed above with BIMAL John.      Myrna Almeida, BSN, RN, CCRN   Palliative Care Nurse Coordinator   [facility]  [phone]

## 2017-01-13 NOTE — NURSING
Report given to ADE Acevedo. Patient resting comfortably, no complaints, no acute distress noted. 12 hour chart check completed

## 2017-01-13 NOTE — NURSING
Report received from ADE Acevedo. Patient resting comfortably, no complaints, no acute distress noted. Family at bedside. Will continue to monitor.

## 2017-01-13 NOTE — PLAN OF CARE
[facility]    HOME HEALTH ORDERS  FACE TO FACE ENCOUNTER    Patient Name: Mercy Pineda  YOB: 1950    PCP: Azikiwe K Lombard, MD   PCP Address: [address] / [id number]  PCP Phone Number: [phone]  PCP Fax: [phone]    Encounter Date: 01/13/2017    Admit to Home Health    Diagnoses:  Active Hospital Problems    Diagnosis  POA    *Infected decubitus ulcer [L89.90]  Yes     Priority: 1 - High    Multiple open wounds of lower extremity [S81.809A]  Yes     Priority: 2     Pressure ulcer of right buttock, stage 4 [L89.314]  Yes     Priority: 2     Dementia of the Alzheimer's type [G30.9, F02.80]  Yes     Priority: 3      Chronic    Cachexia [R64]  Yes     Priority: 4     Epilepsy [G40.909]  Yes     Priority: 5      Chronic    Essential hypertension [I10]  Yes     Priority: 6      Chronic    Hypothyroidism [E03.9]  Yes     Priority: 7      Chronic    DNR (do not resuscitate) [Z66]  Yes     Priority: 10       Resolved Hospital Problems    Diagnosis Date Resolved POA   No resolved problems to display.       No future appointments.  Follow-up Information     Follow up with Azikiwe K Lombard, MD.    Specialty:  Family Medicine    Contact information:    [address] [id number] [phone]          Follow up with [facility].    Specialties:  DME Provider, Home Health Services    Why:  Home Health    Contact information:    [address] [facility]  [place] [id number] [phone]              I have seen and examined this patient face to face today. My clinical findings that support the need for the home health skilled services and home bound status are the following:  Weakness/numbness causing balance and gait disturbance due to Weakness/Debility making it taxing to leave home.    Allergies:Review of patient's allergies indicates:  No Known Allergies    Diet: NPO. All medications and tube feedings per PEG.  Nutren 2.0 @  goal rate 35 mls/hr continuous via PEG. Hold TF x 4 hrs for residuals >250 mls. Flush 180 mls Q4 hrs to meet fluid needs.    Activities: activity as tolerated    Nursing:   SN to complete comprehensive assessment including routine vital signs. Instruct on disease process and s/s of complications to report to MD. Review/verify medication list sent home with the patient at time of discharge  and instruct patient/caregiver as needed. Frequency may be adjusted depending on start of care date.    Notify MD if SBP > 160 or < 90; DBP > 90 or < 50; HR > 120 or < 50; Temp > 101.      CONSULTS:     to evaluate for community resources/long-range planning.  Aide to provide assistance with personal care, ADLs, and vital signs.    MISCELLANEOUS CARE:  PEG Care:  Instruct patient/caregiver to clean site.  Monitor skin integrity.      WOUND CARE ORDERS  Local wound care to sacrum with Santyl and NS gauze. Local wound care to bilateral hips and left heel with hydrogel + gauze dressings. Wound care daily.  Local wound care to right foot with dry gauze daily.   Continue Pressure Injury Prevention Interventions with frequent pressure shifts.        Medications: Review discharge medications with patient and family and provide education.      Current Discharge Medication List      START taking these medications    Details   amoxicillin-clavulanate 875-125mg (AUGMENTIN) 875-125 mg per tablet 1 tablet by Per G Tube route every 12 (twelve) hours.  Qty: 28 tablet, Refills: 0      collagenase ointment Apply topically once daily.  Qty: 90 g, Refills: 0      linezolid (ZYVOX) 600 mg Tab 1 tablet (600 mg total) by Per G Tube route every 12 (twelve) hours.  Qty: 28 tablet, Refills: 0         CONTINUE these medications which have NOT CHANGED    Details              levetiracetam (KEPPRA) 500 MG Tab Take 1 tablet (500 mg total) by mouth 2 (two) times daily.  Qty: 60 tablet, Refills: 5    Associated Diagnoses: Seizure disorder       levothyroxine (SYNTHROID) 75 MCG tablet Take 1 tablet (75 mcg total) by mouth before breakfast.  Qty: 30 tablet, Refills: 5    Associated Diagnoses: Acquired hypothyroidism      memantine (NAMENDA) 10 MG Tab Take 1 tablet (10 mg total) by mouth 2 (two) times daily.  Qty: 60 tablet, Refills: 5      donepezil (ARICEPT) 10 MG tablet Take 1 tablet (10 mg total) by mouth every evening.  Qty: 30 tablet, Refills: 5    Associated Diagnoses: Alzheimer's dementia without behavioral disturbance, Alzheimer's disease of unspecified onset      gel base no.41, bulk, Gel 1 application by Misc.(Non-Drug; Combo Route) route 4 (four) times daily as needed.  Qty: 500 g, Refills: 11    Comments: Hydrogel or the equivalent      triamcinolone acetonide 0.5% (KENALOG) 0.5 % Crea Apply a thin film to affected area twice daily for 7-14 days  Qty: 30 g, Refills: 0    Associated Diagnoses: Rash         STOP taking these medications       mirtazapine (REMERON) 15 MG tablet Comments:   Reason for Stopping:               I certify that this patient is confined to her home and needs intermittent skilled nursing care.

## 2017-01-13 NOTE — PLAN OF CARE
Problem: Fall Risk (Adult)  Intervention: Reduce Risk/Promote Restraint Free Environment    17 1100   Prevent [place] Drop/Fall   Safety/Security Measures bed alarm set --    Safety Interventions   Safety Precautions --  emergency equipment at bedside   Safety Interventions   Environmental Safety Modification room organization consistent;clutter free environment maintained;room near unit station --        Intervention: Review Medications/Identify Contributors to Fall Risk    17   Safety Interventions   Medication Review/Management medications reviewed       Intervention: Patient Rounds    17 1100   Safety Interventions   Patient Rounds bed in low position;bed wheels locked;call light in reach;clutter free environment maintained;ID band on;placement of personal items at bedside;toileting offered;visualized patient       Intervention: Safety Promotion/Fall Prevention    17 1100   Safety Interventions   Safety Promotion/Fall Prevention assistive device/personal item within reach;bed alarm set;Fall Risk reviewed with patient/family;lighting adjusted         Goal: Identify Related Risk Factors and Signs and Symptoms  Related risk factors and signs and symptoms are identified upon initiation of Human Response Clinical Practice Guideline (CPG)   Outcome: Ongoing (interventions implemented as appropriate)    17   Fall Risk   Related Risk Factors (Fall Risk) age-related changes;environment unfamiliar;confusion/agitation   Signs and Symptoms (Fall Risk) presence of risk factors       Goal: Absence of Falls  Patient will demonstrate the desired outcomes by discharge/transition of care.   Outcome: Ongoing (interventions implemented as appropriate)    17   Fall Risk (Adult)   Absence of Falls making progress toward outcome         Problem: Pressure Ulcer Risk (Raúl Scale) (Adult,Obstetrics,Pediatric)  Intervention: Promote/Optimize Nutrition    17 5239    Nutrition Interventions   Oral Nutrition Promotion calorie dense liquids provided;rest periods promoted       Intervention: Prevent/Manage Excess Moisture    01/11/17 0628 01/11/17 1309   Skin Interventions   Skin Protection skin-to-skin areas padded;transparent dressing maintained;tubing/devices free from skin contact --    Hygiene Care   Perineal Care absorbent pad changed;diaper changed;perineum cleansed --    Bathing/Skin Care --  incontinence care       Intervention: Maintain Head of Bed Elevation Less Than 30 Degrees as Tolerated    01/13/17 1100   Positioning   Head of Bed (HOB) HOB at 30-45 degrees       Intervention: Prevent/Minimize Sheer/Friction Injuries    01/11/17 0628 01/12/17 2000   Skin Interventions   Pressure Reduction Techniques weight shift assistance provided --    Positioning   Positioning/Transfer Devices --  wedge;pillows       Intervention: Turn/Reposition Often    01/13/17 1100   Positioning   Body Position side-lying, right         Goal: Identify Related Risk Factors and Signs and Symptoms  Related risk factors and signs and symptoms are identified upon initiation of Human Response Clinical Practice Guideline (CPG)   Outcome: Ongoing (interventions implemented as appropriate)    01/11/17 0628   Pressure Ulcer Risk (Raúl Scale)   Related Risk Factors (Pressure Ulcer Risk (Raúl Scale)) cognitive impairment;infection;mobility impaired;mental impairment;nutritional deficiencies;skeletal deformities;mechanical forces;medical devices       Goal: Skin Integrity  Patient will demonstrate the desired outcomes by discharge/transition of care.     01/13/17 1234   Pressure Ulcer Risk (Raúl Scale) (Adult,Obstetrics,Pediatric)   Skin Integrity unable to achieve outcome

## 2017-01-13 NOTE — PROGRESS NOTES
Handoff report from ADE Castellon. Evaluated general patient appearance/condition. Patient resting quietly, easily aroused per verbal stimuli. Shift assessment completed. No apparent distress noted at this time. Will continue to monitor.

## 2017-01-13 NOTE — PROGRESS NOTES
[facility]  Adult Nutrition  Consult Note    SUMMARY     Recommendations    Recommendation/Intervention:   1. Rec add Arginaid bid via PEG for wound healing aid   2. Consider add Vit C 500 mg bid and 220 mg Zinc Sulfate each x 10 days via PEG to aid with healing   3. Monitor weight weekly  4. RD to monitor  Goals: 1.) Patient will recieve nutrition in 48 hrs.   Nutrition Goal Status: progressing towards goal  Communication of RD Recs:  (sticky note)    Continuum of Care Plan    Referral to Outpatient Services:  (D/C planning: TF to promote healing/weight gain)    Reason for Assessment    Reason for Assessment: RD follow-up  Diagnosis: infection/sepsis (Stage IV wound)  Relevent Medical History: Dementia, cachexia, HTN, HLD, CKD-III, Cancer   Interdisciplinary Rounds: did not attend     General Information Comments: Tolerating TF. Not canidate for surgical debridement. Wound found to have MRSA.     Nutrition Prescription Ordered    Current Diet Order: NPO     Evaluation of Received Nutrients/Fluid Intake    Enteral Calories (kcal): 1680  Enteral Protein (gm): 71  Enteral (Free Water) Fluid (mL): 581  Free Water Flush Fluid (mL): 1080      Energy Calories Required: meeting needs      Protein Required: meeting needs      IV Fluid (mL): 2400      Fluid Required: meeting needs         Nutrition Risk Screen     Nutrition Risk Screen: tube feeding or parenteral nutrition, large or nonhealing wound, burn or pressure ulcer, dysphagia or difficulty swallowing    Nutrition/Diet History    Factors Affecting Nutritional Intake: NPO     Labs/Tests/Procedures/Meds    Diagnostic Test/Procedure Review: reviewed  Pertinent Labs Reviewed: reviewed, pertinent  Pertinent Labs Comments: alb 1.8  Pertinent Medications Reviewed: reviewed  Pertinent Medications Comments: levothyroxine    Physical Findings    Overall Physical Appearance: underweight  Tubes: gastrostomy tube   Skin: pressure ulcer(s) (Stage IV, Stage II x  3, heel x 1, foot x 1)    Anthropometrics     Height (inches): 69.02 in  Weight Method: Bed Scale  Weight (kg): 44.6 kg     Ideal Body Weight (IBW), Female: 145.1 lb     % Ideal Body Weight, Female (lb): 67.77 lb  BMI (kg/m2): 14.51  BMI Grade: less than 16 protein-energy malnutrition grade III     Estimated/Assessed Needs    Weight Used For Calorie Calculations: 44.6 kg (98 lb 5.2 oz)   Height (cm): 175.3 cm      RMR ([place]-St. Jeor Equation): 1065.3      Weight Used For Protein Calculations: 44.6 kg (98 lb 5.2 oz)  Protein Requirements: 60-70 g    Fluid Need Method: RDA Method (Or PER MD)     Malnutrition (Undernutrition) Diagnosis    % Intake of Estimated Energy Needs: 75 - 100%  % Meal Intake: NPO     Nutrition Diagnosis    Nutrition Problem: Inadequate energy intake  Etiology/Related To: decreased ability to consume sufficient energy  Nutrition Diagnosis Signs/Symptoms As Evidenced By: BMI <16  Nutrition Diagnosis Status: New    Monitor and Evaluation    Food and Nutrient Intake: energy intake, enteral nutrition intake  Food and Nutrient Adminstration: diet order, enteral and parenteral nutrition administration   Anthropometric Measurements: weight, weight change, body mass index  Biochemical Data, Medical Tests and Procedures: electrolyte and renal panel, lipid profile, glucose/endocrine profile  Nutrition-Focused Physical Findings: overall appearance    Nutrition Risk    Level of Risk:  (x2 weekly)    Nutrition Follow-Up    RD Follow-up?: Yes

## 2017-01-13 NOTE — NURSING
Report received from ADE Acevedo. Patient resting comfortably, no complaints, no acute distress noted. Will continue to monitor.

## 2017-01-13 NOTE — PLAN OF CARE
01/13/17 1228   Discharge Reassessment   Assessment Type Discharge Planning Reassessment   How does the patient rate their overall health at the present time? Poor   Discharge Plan A Home with family;Home Health   Discharge Plan B Hospice/home   Change in patient condition or support system Yes   Patient choice form signed by patient/caregiver N/A   Explained to the the patient/caregiver why the discharge planned changed: Yes   Involved the patient/caregiver in establishing a new discharge plan: Yes     According to pt family, pt will discharge home with family. Pt will resume [place] [place], and start palliative care with Jesusita Damon.

## 2017-01-13 NOTE — CONSULTS
Consult Note  Infectious Disease    Consult Requested By: Danyell Jones MD    Reason for Consult: necrotic buttock decubitus    SUBJECTIVE:     History of Present Illness:  Patient is a 66 y.o. female presents with foul smelling necrotic decubitus.she has battled with decubiti despite a hospital bed, a gel overlay and frequent turning. She was found to have a foul odor and was brought to the hospital. She has been declined surgical debridement as very poor surgical risk. She is contracted and bed bound and nonverbal. Family understands futility of situation but wishes to do what they can to help her. They appear not to be ready for hospice.  Past Medical History   Diagnosis Date    Alzheimer disease     Cancer      thyroid    Chronic kidney disease, stage III (moderate)     Decubitus ulcer of sacral region, stage 4     Dyslipidemia     Essential hypertension, benign     History of thyroid cancer     Hypothyroidism     Seizure disorder     Seizures     Unspecified vitamin D deficiency      Past Surgical History   Procedure Laterality Date    Thyroidectomy      Gastrostomy tube placement       Family History   Problem Relation Age of Onset    Hypertension Daughter     Alzheimer's disease Mother     Alzheimer's disease       grandmother    Alzheimer's disease       aunt     Social History   Substance Use Topics    Smoking status: Never Smoker    Smokeless tobacco: Never Used    Alcohol use No       Review of patient's allergies indicates:  No Known Allergies     Antibiotics     Start     Stop Route Frequency Ordered    01/11/17 1730  piperacillin-tazobactam 4.5 g in dextrose 5 % 100 mL IVPB (ready to mix system)      -- IV Every 8 hours (non-standard times) 01/11/17 1629          Review of Systems:  Review of systems not obtained due to patient factors nonverbal.    OBJECTIVE:     Vital Signs (Most Recent)  Temp: 98.1 °F (36.7 °C) (01/12/17 1600)  Pulse: 84 (01/12/17 1600)  Resp: 16 (01/12/17  1600)  BP: (!) 120/56 (01/12/17 1600)  SpO2: 99 % (01/12/17 1600)    Temperature Range Min/Max (Last 24H):  Temp:  [97.4 °F (36.3 °C)-98.1 °F (36.7 °C)]     Physical Exam:  General: cachectic  Eyes: conjunctivae/corneas clear. PERRL..  HENT: Head:normocephalic, atraumatic. Ears:not examined. Nose: Nares normal. Septum midline. Mucosa normal. No drainage or sinus tenderness., no discharge. Throat: lips, mucosa, and tongue normal; teeth and gums normal and no throat erythema.  Neck: supple, symmetrical, trachea midline, no JVD and thyroid not enlarged, symmetric, no tenderness/mass/nodules  Lungs:  clear to auscultation bilaterally and normal respiratory effort  Cardiovascular: Heart: regular rate and rhythm, S1, S2 normal, no murmur, click, rub or gallop. Chest Wall: no tenderness. Extremities: contracted. Pulses: not examined.  Abdomen/Rectal: Abdomen: soft, non-tender non-distented; bowel sounds normal; no masses,  no organomegaly. Rectal: not examined  Skin: lt buttock with foul decubitus- 100% slough. rt buttock decubitus istage 4 but clran. both heels with eschar. rt lateral malleolus with unstageable decubituis. lt malleolus, lateral with DTI    Laboratory:  CBC    Recent Labs  Lab 01/12/17  0903   WBC 9.89   RBC 3.63*   HGB 10.0*   HCT 31.3*   *     BMP    Recent Labs  Lab 01/12/17  0903   CO2 28   BUN 9   CREATININE 0.6   CALCIUM 8.3*     No results for input(s): COLORU, CLARITYU, SPECGRAV, PHUR, PROTEINUA, GLUCOSEU, BILIRUBINCON, BLOODU, WBCU, RBCU, BACTERIA, MUCUS, NITRITE, LEUKOCYTESUR, UROBILINOGEN, HYALINECASTS in the last 168 hours.  Microbiology Results (last 7 days)     Procedure Component Value Units Date/Time    Aerobic culture (Specify Source) **CANNOT BE ORDERED AS STAT** [[id number]]  (Susceptibility) Collected:  01/10/17 1900    Order Status:  Completed Specimen:  Decubitus from Buttocks, Left Updated:  01/12/17 0824     Aerobic Bacterial Culture --     GRAM NEGATIVE  AN  Moderate  Identification and susceptibility pending       Aerobic Bacterial Culture --     ESCHERICHIA COLI  Moderate       Aerobic Bacterial Culture --     STAPHYLOCOCCUS AUREUS  Moderate  Susceptibility pending      Blood culture [[id number]] Collected:  01/10/17 1918    Order Status:  Completed Specimen:  Blood from Peripheral, Hand, Left Updated:  01/11/17 2103     Blood Culture, Routine No Growth to date     Blood Culture, Routine No Growth to date    Blood culture [[id number]] Collected:  01/10/17 1955    Order Status:  Completed Specimen:  Blood from Peripheral, Hand, Left Updated:  01/11/17 2103     Blood Culture, Routine No Growth to date     Blood Culture, Routine No Growth to date          Diagnostic Results:  Labs: Reviewed  X-Ray: Reviewed    ASSESSMENT/PLAN:     Active Hospital Problems    Diagnosis  POA    DNR (do not resuscitate) [Z66]  Yes    Cachexia [R64]  Yes    Infected decubitus ulcer [L89.90]  Yes    Multiple open wounds of lower extremity [S81.809A]  Yes    Pressure ulcer of right buttock, stage 4 [L89.314]  Yes    Essential hypertension [I10]  Yes     Chronic    Epilepsy [G40.909]  Yes     Chronic    Dementia of the Alzheimer's type [G30.9, F02.80]  Yes     Chronic    Hypothyroidism [E03.9]  Yes     Chronic      Resolved Hospital Problems    Diagnosis Date Resolved POA   No resolved problems to display.       1. MULTIPLE DECUBITI IN A PATIENT WITH ADVANCED DEMENTIA  LT BUTTOCK IS THE WORST OF THEM. SUGGEST ENZYMATIC DEBRIDEMENT WITH SANTYL AND ASK QING FIELDS TO SEE. SHE MAY BE ABLE TO REMOVE NECROTIC TISSUE AT BEDSIDE  SHE OBVIOUSLY IS DYING SLOWLY  FAMILY UNABLE TO PROCEED TO HOSPICE BECAUSE THEY SEEM UNABLE TO ACCEPT SITUATION  BEING PEG TUBE FED BUT CACHECTIC  SHORT COURSE OF ANTIBIOTICS  PROGNOSIS IS DISMAL  Will have final regimen in am

## 2017-01-13 NOTE — PLAN OF CARE
01/13/17 1359   Final Note   Assessment Type Final Discharge Note   Discharge Disposition Home-Health   What phone number can be called within the next 1-3 days to see how you are doing after discharge? [phone]   Hospital Follow Up  Appt(s) scheduled? Yes   Offered [facility] -- Bedside Delivery? n/a   Discharge/Hospital Encounter Summary to (non-[facility]) PCP No   Referral to Outpatient Case Management complete? No   Referral to / orders for Home Health Complete? Yes   30 day supply of medicines given at discharge, if documented non-compliance / non-adherence? No   Any social issues identified prior to discharge? No   Did you assess the readiness or willingness of the family or caregiver to support self management of care? Yes     BIMAL spoke with Genia with [facility] concerning services provided to pt. Genia e-mailed a request form for palliative care  to BIMAL to have signed by Dr. Jones. Form signed by Dr. Jones and e-mailed back to Omayra Farrell confirmed palliative care services will be provided at discharge.     Orders received for HH. Pt will resume HH with [facility]. BIMAL sent pt face-sheet, orders, and H&P to [facility] via [facility]. BIMAL received e-mail confirming [facility] will provide HH services.    BIMAL arranged transportation via [facility] @ [phone]; BIMAL spoke with Leticia who scheduled transportation; transportation ETA scheduled for 5:00PM.      BIMAL contacted pt daughter Erin at the number listed in chart. BIMAL informed Erin pt will continue with [facility] and [facility] will provide palliative care services. BIMLA informed Gaudencio an e-mail has been sent to [facility] confirming services will resume. BIMAL explained [facility] ETA is 5:00PM and pt scripts has been sent to [facility] and the total cost is 6.10. Erin verbalized understanding. BIMAL provided paperwork to the sitter located in pt room. BIMAL informed pt nurse Cande all CM needs have been met

## 2017-01-13 NOTE — PLAN OF CARE
Problem: Fall Risk (Adult)  Intervention: Reduce Risk/Promote Restraint Free Environment    17   Prevent  Drop/Fall   Safety/Security Measures bed alarm set   Safety Interventions   Environmental Safety Modification room organization consistent;clutter free environment maintained;room near unit station       Intervention: Review Medications/Identify Contributors to Fall Risk    17   Safety Interventions   Medication Review/Management medications reviewed       Intervention: Patient Rounds    17   Safety Interventions   Patient Rounds bed in low position;bed wheels locked;call light in reach;clutter free environment maintained;ID band on;placement of personal items at bedside;toileting offered;visualized patient       Intervention: Safety Promotion/Fall Prevention    17   Safety Interventions   Safety Promotion/Fall Prevention assistive device/personal item within reach;bed alarm set;Fall Risk reviewed with patient/family;lighting adjusted         Goal: Identify Related Risk Factors and Signs and Symptoms  Related risk factors and signs and symptoms are identified upon initiation of Human Response Clinical Practice Guideline (CPG)   Outcome: Ongoing (interventions implemented as appropriate)    17   Fall Risk   Related Risk Factors (Fall Risk) age-related changes;environment unfamiliar;confusion/agitation   Signs and Symptoms (Fall Risk) presence of risk factors       Goal: Absence of Falls  Patient will demonstrate the desired outcomes by discharge/transition of care.   Outcome: Ongoing (interventions implemented as appropriate)    17   Fall Risk (Adult)   Absence of Falls making progress toward outcome

## 2017-01-13 NOTE — PLAN OF CARE
01/12/17 1402   Medicare Message   Important Message from Medicare regarding Discharge Appeal Rights Given to patient/caregiver;Explained to patient/caregiver;Signed/date by patient/caregiver   Date IMM was signed 01/12/17   Time IMM was signed [id number]

## 2017-01-13 NOTE — PROGRESS NOTES
Nursing preparing patient for discharge home. Visited daughter as requested to instruct on use of Santyl. Demonstrated use of Santyl and filling of wound with NS moistened gauze. Noted wound debriding with use of Santyl. Questions answered. Returning home with [facility]  and equipment at home for care of pressure injury.

## 2017-01-13 NOTE — PLAN OF CARE
Culture of decubitus, Left Buttock taken on 1/10/17 is positive for MRSA.  Nursing has initiated Contact Isolation.

## 2017-01-13 NOTE — PROGRESS NOTES
01/13/17 0800   Critical Value Communication   Date Result Received 01/13/17   Time Result Received 0809   Resulting Department of Critical Value lab   Who communicated critical value from resulting department? Rocio Sanderson   Critical Test #1 Decubitis ulcer culture   Critical Test #1 Result MRSA positive   Notified Physician/Designee Dr. Jones   Date Notified 01/13/17   Time Notified 0812   Read Back Verification Yes   Notified MD of decubitus ulcer positive for MRSA. New orders noted.

## 2017-01-13 NOTE — PROGRESS NOTES
Superficial cultures with an e coli, a gnr and an mrsa that is very resistant  rx of these germs are NOT going to result in any miraculous cure  We are making gestures in this poor lady with advanced dementia and horrendous bedsores  Will rx with augmentin and try for linezolid for 2 weeks

## 2017-01-13 NOTE — PROGRESS NOTES
Follow-up Information     Follow up with Azikiwe K Lombard, MD On 1/20/2017.    Specialty:  Family Medicine    Why:  Outpatient Services, PCP follow-up appointment. Patient should arrive by 10:00AM.     Contact information:    [address] [place] [address] [phone]          Follow up with [facility].    Specialties:  DME Provider, Home Health Services    Why:  Home Health    Contact information:    [address] [address]  [phone]          Follow up with [facility] In 1 day.    Why:  Outpatient Services,Palliative Care     Contact information:    [facility]      PLEASE BRING TO ALL FOLLOW UP APPOINTMENTS:   A COPY YOUR DISCHARGE INSTRUCTIONS, Any new MEDICINES YOU ARE CURRENTLY TAKING IN THEIR ORIGINAL BOTTLES  And IDENTIFICATION AND INSURANCE CARD     **PLEASE ARRIVE 15 MINUTES AHEAD OF SCHEDULED APPOINTMENT TIME   ++PLEASE CALL 24 HOURS IN ADVANCE IF YOU MUST RESCHEDULE YOUR APPOINTMENT DAY AND/OR TIME     Thank you for choosing [facility] for your care. Within 48-72 hours after leaving the hospital you will receive a call from [facility] Nurses following up to see how you are doing. The team will ask you a few questions and the call will last approximately 20 minutes.     Please answer any calls you may receive from [facility] we want to continue to support you as you manage your healthcare needs. [facility] is happy to have the opportunity to serve you.    If you have any questions concerning your symptoms:     [facility] On Call Nurse Care Line - 24/7 Assistance  Registered [facility] nurses can provide appointment booking, health education, clinical advisement, and other advisory services.   Call for this free service at [phone].      Again, Thank you for allowing me to help with your discharge planning and choosing [facility] as your healthcare provider.     Dora Hudson, BS, MSW, CSW  [phone]  Care Management

## 2017-01-14 NOTE — NURSING
In preparation for discharge, d/c'ed patient's tele monitor, NSR prior to removal, d/c'ed patient's saline lock, applied pressure to site, secured site with tape and gauze. Discharge instructions given to patient and family at bedside. Family verbalized understanding of instructions. Family states willingness to comply. Saline lock removed. Tele monitoring removed. [facility] at bedside for transport to home. Patient awake, alert. No apparent distress noted.

## 2017-01-14 NOTE — PROGRESS NOTES
1/14/2017 1315  Call received from pt's daughter stating that the zyvox ordered for patient is not covered.  TN spoke with pharmacist.  Zyvox not covered by either medicare or medicaid.  TN spoke with Dr Jones who stated that patient will need medication as soon as able to obtain.  TN will attempt to enroll patient in Zyvox assist program.  Message left for daughterErin at [phone] stating that she should continue to give augmentin and TN will follow up with zyvox.    1/14/2017 1330  Call received from daughter who verbalized understanding of above    1/16/17  TN spoke with Rep at Zyvox assist.  Pt approved for Zyvox x 14days.  ID [id number]  BIN [id number]  Group [id number]  TN called pharmacy.  Pharmacist was able to fill using above info.    TN called daughterGaudencio and notified her med ready for .

## 2017-01-14 NOTE — DISCHARGE SUMMARY
[facility] Hospital Medicine  Discharge Summary      Patient Name: Mercy Pineda  MRN: [id number]  Admission Date: 1/10/2017  Hospital Length of Stay: 3 days  Discharge Date: 1/13/2017   Attending Physician: Danyell Jones MD  Discharging Provider: Danyell Jones MD  Primary Care Provider: Azikiwe K Lombard, MD      HPI:   67 y/o female with end stage Alzheimer, cachexia, chronic stage IV decubiti who was brought in by daughter for foul smelling necrotic decubitus with drainage. Pt has been getting wound care and has a hospital bed with a gel overlay and frequent turning at home. Despite all these measure with tube feedings, her wound continued to to get worse. She is nonverbal, contracted and bed bound at her baseline.    * No surgery found *      Indwelling Lines/Drains at time of discharge:   Lines/Drains/Airways     Drain                 Gastrostomy/Enterostomy 09/02/16 1441 Percutaneous endoscopic gastrostomy (PEG) LUQ feeding 133 days         Gastrostomy/Enterostomy 12/09/16 1313 Percutaneous endoscopic gastrostomy (PEG) midline 35 days          Pressure Ulcer                 Pressure Ulcer 08/29/16 2300 Left buttocks Stage  days         Pressure Ulcer 08/29/16 2300 Right hip Stage I 136 days         Pressure Ulcer 08/30/16 0800 Left ischial tuberosity Stage  days         Pressure Ulcer 01/10/17 0200 Left hip Unstageable 3 days         Pressure Ulcer 01/10/17 0200 Right  Stage IV 3 days         Pressure Ulcer 01/10/17 0837 Left lateral Stage I 3 days         Pressure Ulcer 01/10/17 0846 Right lateral other (see comments) suspected deep tissue injury 3 days         Pressure Ulcer 01/10/17 0856 Right ear, right Stage I 3 days         Pressure Ulcer 01/11/17 0200 Left heel 2 days         Pressure Ulcer 01/11/17 0200 Right lateral foot 2 days              Hospital Course:   67 y/o female with end stage Alzheimer, cachexia, chronic stage IV decubiti admitted for infected decubiti. Pt  was on empiric antibiotics as per ID, but overall prognosis poor and wound is unsalvageable. Pt was seen by surgery, and she is not a surgical candidate. Pt was seen by wound care and antibiotics adjusted to Augmentin and Zyvox by ID for 2 week course for infection due to MRSA and E. coli. Palliative care followed given patient is hospice appropriate, but family was not ready for hospice, but were agreeable for palliative care nurse to follow as outpatient along with home health for wound care. Overall, patient will not recover and is slowly dying and this was conveyed to the family by the whole care team. Pt is DNR and LaPOST is in the chart. Hopefully, family will be agreeable for hospice at some point.     Consults:   Consults         Status Ordering Provider     Inpatient consult to General surgery  Once     Provider:  Gerson Coronado MD    Completed ELENA GILL III     Inpatient consult to Infectious Diseases  Once     Provider:  Jayne Alejandro MD    Completed JACKELYN DUNBAR     Inpatient consult to Palliative Care  Once     Provider:  (Not yet assigned)    Completed JACKELYN DNUBAR     IP consult to dietary  Once     Provider:  (Not yet assigned)    Completed JACKELYN DUNBAR          Significant Diagnostic Studies: Wound culture with MRSA and E.coli    Pending Diagnostic Studies:     None        Final Active Diagnoses:    Diagnosis Date Noted POA    PRINCIPAL PROBLEM:  Infected decubitus ulcer [L89.90] 01/10/2017 Yes    Multiple open wounds of lower extremity [S81.809A] 12/08/2016 Yes    Pressure ulcer of right buttock, stage 4 [L89.314]  Yes    Dementia of the Alzheimer's type [G30.9, F02.80] 11/06/2012 Yes     Chronic    Cachexia [R64] 01/11/2017 Yes    Epilepsy [G40.909] 08/30/2016 Yes     Chronic    Essential hypertension [I10] 08/30/2016 Yes     Chronic    Hypothyroidism [E03.9] 10/18/2012 Yes     Chronic    DNR (do not resuscitate) [Z66] 01/12/2017 Yes      Problems Resolved During this  Admission:    Diagnosis Date Noted Date Resolved POA        Discharged Condition: poor    Disposition: Home-Health Care [facility]    Follow Up:  Follow-up Information     Follow up with Azikiwe K Lombard, MD On 1/20/2017.    Specialty:  Family Medicine    Why:  Outpatient Services, PCP follow-up appointment. Patient should arrive by 10:00AM.     Contact information:    [address] [place] [address] [phone]          Follow up with [facility].    Specialties:  DME Provider, Home Health Services    Why:  Home Health    Contact information:    [address] [address]  [phone]          Follow up with [facility] In 1 day.    Why:  Outpatient Services,Palliative Care     Contact information:    [facility]        Patient Instructions:     Diet general   Order Comments: NPO. Tube feedings.     Activity as tolerated       Medications:  Reconciled Home Medications:   Discharge Medication List as of 1/13/2017  5:07 PM      START taking these medications    Details   amoxicillin-clavulanate 875-125mg (AUGMENTIN) 875-125 mg per tablet 1 tablet by Per G Tube route every 12 (twelve) hours., Starting 1/13/2017, Until Fri 1/27/17, Normal      collagenase ointment Apply topically once daily., Starting 1/13/2017, Until Discontinued, Normal      linezolid (ZYVOX) 600 mg Tab 1 tablet (600 mg total) by Per G Tube route every 12 (twelve) hours., Starting 1/13/2017, Until Fri 1/27/17, Normal         CONTINUE these medications which have CHANGED    Details   levetiracetam (KEPPRA) 500 MG Tab Take 1 tablet (500 mg total) by mouth 2 (two) times daily., Starting 1/13/2017, Until Sat 1/13/18, No Print         CONTINUE these medications which have NOT CHANGED    Details   levothyroxine (SYNTHROID) 75 MCG tablet Take 1 tablet (75 mcg total) by mouth before breakfast., Starting 6/23/2016, Until Discontinued, Normal      memantine (NAMENDA) 10 MG Tab Take 1 tablet (10 mg total)  by mouth 2 (two) times daily., Starting 10/25/2016, Until Discontinued, Normal      donepezil (ARICEPT) 10 MG tablet Take 1 tablet (10 mg total) by mouth every evening., Starting 6/23/2016, Until Discontinued, Normal      gel base no.41, bulk, Gel 1 application by Misc.(Non-Drug; Combo Route) route 4 (four) times daily as needed., Starting 11/16/2016, Until Discontinued, Normal      triamcinolone acetonide 0.5% (KENALOG) 0.5 % Crea Apply a thin film to affected area twice daily for 7-14 days, Normal         STOP taking these medications       escitalopram oxalate (LEXAPRO) 20 MG tablet Comments:   Reason for Stopping:         mirtazapine (REMERON) 15 MG tablet Comments:   Reason for Stopping:             Time spent on the discharge of patient: 50 minutes    Danyell Jones MD  Department of Hospital Medicine  [facility]

## 2017-01-15 LAB
BACTERIA BLD CULT: NORMAL
BACTERIA BLD CULT: NORMAL
BACTERIA SPEC AEROBE CULT: NORMAL

## 2017-01-27 DIAGNOSIS — G40.909 SEIZURE DISORDER: Primary | ICD-10-CM

## 2017-01-27 DIAGNOSIS — E03.9 ACQUIRED HYPOTHYROIDISM: ICD-10-CM

## 2017-01-27 RX ORDER — LEVETIRACETAM 500 MG/1
500 TABLET ORAL 2 TIMES DAILY
Qty: 60 TABLET | Refills: 0 | Status: SHIPPED | OUTPATIENT
Start: 2017-01-27 | End: 2018-01-27

## 2017-01-27 RX ORDER — LEVOTHYROXINE SODIUM 75 UG/1
75 TABLET ORAL
Qty: 30 TABLET | Refills: 5 | Status: SHIPPED | OUTPATIENT
Start: 2017-01-27

## 2017-01-27 NOTE — TELEPHONE ENCOUNTER
----- Message from [facility] SHELL Poe sent at 1/26/2017  4:07 PM CST -----  Contact: Daughter [phone]  Medication refill request    levothyroxine (SYNTHROID) 75 MCG tablet  levetiracetam (KEPPRA) 500 MG Tab    Thanks!

## 2017-03-13 ENCOUNTER — HOSPITAL ENCOUNTER (INPATIENT)
Facility: HOSPITAL | Age: 67
LOS: 3 days | Discharge: HOSPICE | DRG: 871 | End: 2017-03-16
Attending: EMERGENCY MEDICINE | Admitting: HOSPITALIST
Payer: MEDICARE

## 2017-03-13 DIAGNOSIS — Z85.850 HISTORY OF THYROID CANCER: ICD-10-CM

## 2017-03-13 DIAGNOSIS — E55.9 UNSPECIFIED VITAMIN D DEFICIENCY: ICD-10-CM

## 2017-03-13 DIAGNOSIS — N18.30 CKD (CHRONIC KIDNEY DISEASE), STAGE III: Chronic | ICD-10-CM

## 2017-03-13 DIAGNOSIS — R64 CACHEXIA: ICD-10-CM

## 2017-03-13 DIAGNOSIS — J69.0 ASPIRATION PNEUMONIA OF BOTH LOWER LOBES, UNSPECIFIED ASPIRATION PNEUMONIA TYPE: Primary | ICD-10-CM

## 2017-03-13 DIAGNOSIS — Z93.1 PERCUTANEOUS ENDOSCOPIC GASTROSTOMY STATUS: Chronic | ICD-10-CM

## 2017-03-13 DIAGNOSIS — E86.0 DEHYDRATION: ICD-10-CM

## 2017-03-13 DIAGNOSIS — E43 SEVERE PROTEIN-CALORIE MALNUTRITION: Chronic | ICD-10-CM

## 2017-03-13 DIAGNOSIS — E89.0 HISTORY OF THYROIDECTOMY: ICD-10-CM

## 2017-03-13 DIAGNOSIS — F03.90 DEMENTIA WITHOUT BEHAVIORAL DISTURBANCE, UNSPECIFIED DEMENTIA TYPE: Chronic | ICD-10-CM

## 2017-03-13 DIAGNOSIS — R65.21 SEPTIC SHOCK: ICD-10-CM

## 2017-03-13 DIAGNOSIS — D63.8 ANEMIA OF CHRONIC DISEASE: Chronic | ICD-10-CM

## 2017-03-13 DIAGNOSIS — E03.9 HYPOTHYROIDISM, UNSPECIFIED TYPE: Chronic | ICD-10-CM

## 2017-03-13 DIAGNOSIS — A41.9 SEVERE SEPSIS: ICD-10-CM

## 2017-03-13 DIAGNOSIS — R65.20 SEVERE SEPSIS: ICD-10-CM

## 2017-03-13 DIAGNOSIS — A41.9 SEPTIC SHOCK: ICD-10-CM

## 2017-03-13 DIAGNOSIS — L89.314 PRESSURE ULCER OF RIGHT BUTTOCK, STAGE 4: ICD-10-CM

## 2017-03-13 DIAGNOSIS — Z66 DNR (DO NOT RESUSCITATE): ICD-10-CM

## 2017-03-13 DIAGNOSIS — Z86.73 HISTORY OF CVA (CEREBROVASCULAR ACCIDENT): Chronic | ICD-10-CM

## 2017-03-13 LAB
ALBUMIN SERPL BCP-MCNC: 1.3 G/DL
ALP SERPL-CCNC: 133 U/L
ALT SERPL W/O P-5'-P-CCNC: 37 U/L
ANION GAP SERPL CALC-SCNC: 9 MMOL/L
AST SERPL-CCNC: 58 U/L
BACTERIA #/AREA URNS HPF: ABNORMAL /HPF
BASOPHILS # BLD AUTO: ABNORMAL K/UL
BASOPHILS NFR BLD: 0 %
BILIRUB SERPL-MCNC: 1 MG/DL
BILIRUB UR QL STRIP: NEGATIVE
BNP SERPL-MCNC: 50 PG/ML
BUN SERPL-MCNC: 19 MG/DL
CALCIUM SERPL-MCNC: 8.7 MG/DL
CHLORIDE SERPL-SCNC: 94 MMOL/L
CLARITY UR: ABNORMAL
CO2 SERPL-SCNC: 24 MMOL/L
COLOR UR: ABNORMAL
CREAT SERPL-MCNC: 0.6 MG/DL
CTP QC/QA: YES
DIFFERENTIAL METHOD: ABNORMAL
EOSINOPHIL # BLD AUTO: ABNORMAL K/UL
EOSINOPHIL NFR BLD: 0 %
ERYTHROCYTE [DISTWIDTH] IN BLOOD BY AUTOMATED COUNT: 16.1 %
EST. GFR  (AFRICAN AMERICAN): >60 ML/MIN/1.73 M^2
EST. GFR  (NON AFRICAN AMERICAN): >60 ML/MIN/1.73 M^2
FECAL OCCULT BLOOD, POC: NEGATIVE
FLUAV AG SPEC QL IA: NEGATIVE
FLUBV AG SPEC QL IA: NEGATIVE
GLUCOSE SERPL-MCNC: 102 MG/DL
GLUCOSE UR QL STRIP: NEGATIVE
HCT VFR BLD AUTO: 25.8 %
HGB BLD-MCNC: 7.9 G/DL
HGB UR QL STRIP: ABNORMAL
HYALINE CASTS #/AREA URNS LPF: 0 /LPF
KETONES UR QL STRIP: NEGATIVE
LACTATE SERPL-SCNC: 2.3 MMOL/L
LEUKOCYTE ESTERASE UR QL STRIP: NEGATIVE
LYMPHOCYTES # BLD AUTO: ABNORMAL K/UL
LYMPHOCYTES NFR BLD: 3 %
MCH RBC QN AUTO: 24.4 PG
MCHC RBC AUTO-ENTMCNC: 30.6 %
MCV RBC AUTO: 80 FL
MICROSCOPIC COMMENT: ABNORMAL
MONOCYTES # BLD AUTO: ABNORMAL K/UL
MONOCYTES NFR BLD: 4 %
NEUTROPHILS NFR BLD: 57 %
NEUTS BAND NFR BLD MANUAL: 36 %
NITRITE UR QL STRIP: NEGATIVE
PH UR STRIP: 5 [PH] (ref 5–8)
PLATELET # BLD AUTO: 329 K/UL
PMV BLD AUTO: 10.2 FL
POTASSIUM SERPL-SCNC: 4.8 MMOL/L
PROT SERPL-MCNC: 6.4 G/DL
PROT UR QL STRIP: ABNORMAL
RBC # BLD AUTO: 3.24 M/UL
RBC #/AREA URNS HPF: 8 /HPF (ref 0–4)
SODIUM SERPL-SCNC: 127 MMOL/L
SP GR UR STRIP: 1.02 (ref 1–1.03)
SPECIMEN SOURCE: NORMAL
SQUAMOUS #/AREA URNS HPF: 3 /HPF
TROPONIN I SERPL DL<=0.01 NG/ML-MCNC: <0.006 NG/ML
URN SPEC COLLECT METH UR: ABNORMAL
UROBILINOGEN UR STRIP-ACNC: ABNORMAL EU/DL
WBC # BLD AUTO: 10.85 K/UL
WBC #/AREA URNS HPF: 1 /HPF (ref 0–5)

## 2017-03-13 PROCEDURE — 96367 TX/PROPH/DG ADDL SEQ IV INF: CPT

## 2017-03-13 PROCEDURE — 93005 ELECTROCARDIOGRAM TRACING: CPT

## 2017-03-13 PROCEDURE — 25500020 PHARM REV CODE 255: Performed by: EMERGENCY MEDICINE

## 2017-03-13 PROCEDURE — 99291 CRITICAL CARE FIRST HOUR: CPT | Mod: 25

## 2017-03-13 PROCEDURE — 25000003 PHARM REV CODE 250: Performed by: EMERGENCY MEDICINE

## 2017-03-13 PROCEDURE — 87077 CULTURE AEROBIC IDENTIFY: CPT | Mod: 59

## 2017-03-13 PROCEDURE — 81000 URINALYSIS NONAUTO W/SCOPE: CPT

## 2017-03-13 PROCEDURE — 96372 THER/PROPH/DIAG INJ SC/IM: CPT

## 2017-03-13 PROCEDURE — 85007 BL SMEAR W/DIFF WBC COUNT: CPT

## 2017-03-13 PROCEDURE — 12000002 HC ACUTE/MED SURGE SEMI-PRIVATE ROOM

## 2017-03-13 PROCEDURE — 83605 ASSAY OF LACTIC ACID: CPT

## 2017-03-13 PROCEDURE — 87186 SC STD MICRODIL/AGAR DIL: CPT

## 2017-03-13 PROCEDURE — 96368 THER/DIAG CONCURRENT INF: CPT

## 2017-03-13 PROCEDURE — 63600175 PHARM REV CODE 636 W HCPCS: Performed by: EMERGENCY MEDICINE

## 2017-03-13 PROCEDURE — 96366 THER/PROPH/DIAG IV INF ADDON: CPT

## 2017-03-13 PROCEDURE — 96361 HYDRATE IV INFUSION ADD-ON: CPT | Mod: XS

## 2017-03-13 PROCEDURE — 96360 HYDRATION IV INFUSION INIT: CPT | Mod: XS

## 2017-03-13 PROCEDURE — 83880 ASSAY OF NATRIURETIC PEPTIDE: CPT

## 2017-03-13 PROCEDURE — 87040 BLOOD CULTURE FOR BACTERIA: CPT | Mod: 59

## 2017-03-13 PROCEDURE — 85027 COMPLETE CBC AUTOMATED: CPT

## 2017-03-13 PROCEDURE — 80053 COMPREHEN METABOLIC PANEL: CPT

## 2017-03-13 PROCEDURE — 96375 TX/PRO/DX INJ NEW DRUG ADDON: CPT

## 2017-03-13 PROCEDURE — 96365 THER/PROPH/DIAG IV INF INIT: CPT

## 2017-03-13 PROCEDURE — 87400 INFLUENZA A/B EACH AG IA: CPT

## 2017-03-13 PROCEDURE — 84484 ASSAY OF TROPONIN QUANT: CPT

## 2017-03-13 PROCEDURE — 82272 OCCULT BLD FECES 1-3 TESTS: CPT

## 2017-03-13 RX ORDER — SODIUM CHLORIDE 9 MG/ML
1000 INJECTION, SOLUTION INTRAVENOUS
Status: COMPLETED | OUTPATIENT
Start: 2017-03-14 | End: 2017-03-14

## 2017-03-13 RX ORDER — SODIUM CHLORIDE 9 MG/ML
1000 INJECTION, SOLUTION INTRAVENOUS
Status: COMPLETED | OUTPATIENT
Start: 2017-03-13 | End: 2017-03-13

## 2017-03-13 RX ADMIN — SODIUM CHLORIDE 1000 ML: 0.9 INJECTION, SOLUTION INTRAVENOUS at 11:03

## 2017-03-13 RX ADMIN — SODIUM CHLORIDE 1000 ML: 0.9 INJECTION, SOLUTION INTRAVENOUS at 06:03

## 2017-03-13 RX ADMIN — PIPERACILLIN SODIUM AND TAZOBACTAM SODIUM 4.5 G: 4; .5 INJECTION, POWDER, LYOPHILIZED, FOR SOLUTION INTRAVENOUS at 11:03

## 2017-03-13 RX ADMIN — VANCOMYCIN HYDROCHLORIDE 1000 MG: 1 INJECTION, POWDER, LYOPHILIZED, FOR SOLUTION INTRAVENOUS at 11:03

## 2017-03-13 RX ADMIN — IOHEXOL 70 ML: 350 INJECTION, SOLUTION INTRAVENOUS at 09:03

## 2017-03-13 RX ADMIN — CEFTRIAXONE 1 G: 1 INJECTION, SOLUTION INTRAVENOUS at 09:03

## 2017-03-13 NOTE — ED TRIAGE NOTES
Pt arrived to ED via EMS with c/o fever, fatigue, and SOB.  Daughter reports that pt has had a fever for the past two days that she has been treating with tylenol.  Pt has a home health nurse that reported that the pt was short of breath earlier and EMS reported that pt was 80% SPO2 sat on room air upon their arrival.  EMS administered 5 L venimask.  Lung sounds are wet and course.  Pt is late stage dementia, is contracted, aphasic, and is disoriented x 4.  Daughter reports pt has large pressure ulcer on her backside of her body.

## 2017-03-14 ENCOUNTER — OUTPATIENT CASE MANAGEMENT (OUTPATIENT)
Dept: ADMINISTRATIVE | Facility: OTHER | Age: 67
End: 2017-03-14

## 2017-03-14 ENCOUNTER — TELEPHONE (OUTPATIENT)
Dept: FAMILY MEDICINE | Facility: CLINIC | Age: 67
End: 2017-03-14

## 2017-03-14 PROBLEM — K59.00 CONSTIPATION: Status: ACTIVE | Noted: 2017-03-14

## 2017-03-14 PROBLEM — N18.30 CKD (CHRONIC KIDNEY DISEASE), STAGE III: Chronic | Status: ACTIVE | Noted: 2017-03-14

## 2017-03-14 PROBLEM — E43 SEVERE PROTEIN-CALORIE MALNUTRITION: Chronic | Status: ACTIVE | Noted: 2017-03-14

## 2017-03-14 PROBLEM — Z86.73 HISTORY OF CVA (CEREBROVASCULAR ACCIDENT): Chronic | Status: ACTIVE | Noted: 2017-03-14

## 2017-03-14 PROBLEM — D63.8 ANEMIA OF CHRONIC DISEASE: Chronic | Status: ACTIVE | Noted: 2017-03-14

## 2017-03-14 PROBLEM — R64 CACHEXIA: Status: RESOLVED | Noted: 2017-01-11 | Resolved: 2017-03-14

## 2017-03-14 PROBLEM — Z93.1 PERCUTANEOUS ENDOSCOPIC GASTROSTOMY STATUS: Chronic | Status: ACTIVE | Noted: 2017-03-14

## 2017-03-14 PROBLEM — R65.20 SEVERE SEPSIS: Status: ACTIVE | Noted: 2017-03-14

## 2017-03-14 PROBLEM — A41.9 SEVERE SEPSIS: Status: ACTIVE | Noted: 2017-03-14

## 2017-03-14 LAB
ALBUMIN SERPL BCP-MCNC: 1.2 G/DL
ALP SERPL-CCNC: 124 U/L
ALT SERPL W/O P-5'-P-CCNC: 34 U/L
ANION GAP SERPL CALC-SCNC: 9 MMOL/L
AST SERPL-CCNC: 54 U/L
BASOPHILS NFR BLD: 0 %
BILIRUB SERPL-MCNC: 1.2 MG/DL
BUN SERPL-MCNC: 16 MG/DL
CALCIUM SERPL-MCNC: 8 MG/DL
CHLORIDE SERPL-SCNC: 99 MMOL/L
CO2 SERPL-SCNC: 21 MMOL/L
CREAT SERPL-MCNC: 0.5 MG/DL
DIFFERENTIAL METHOD: ABNORMAL
EOSINOPHIL NFR BLD: 0 %
ERYTHROCYTE [DISTWIDTH] IN BLOOD BY AUTOMATED COUNT: 16 %
EST. GFR  (AFRICAN AMERICAN): >60 ML/MIN/1.73 M^2
EST. GFR  (NON AFRICAN AMERICAN): >60 ML/MIN/1.73 M^2
GLUCOSE SERPL-MCNC: 91 MG/DL
HCT VFR BLD AUTO: 22.7 %
HGB BLD-MCNC: 7.1 G/DL
HYPOCHROMIA BLD QL SMEAR: ABNORMAL
LYMPHOCYTES NFR BLD: 1 %
MAGNESIUM SERPL-MCNC: 1.4 MG/DL
MCH RBC QN AUTO: 24.7 PG
MCHC RBC AUTO-ENTMCNC: 31.3 %
MCV RBC AUTO: 79 FL
MONOCYTES NFR BLD: 1 %
NEUTROPHILS NFR BLD: 52 %
NEUTS BAND NFR BLD MANUAL: 46 %
PHOSPHATE SERPL-MCNC: 3.2 MG/DL
PLATELET # BLD AUTO: 313 K/UL
PMV BLD AUTO: 9.9 FL
POTASSIUM SERPL-SCNC: 4.2 MMOL/L
PROT SERPL-MCNC: 5.9 G/DL
RBC # BLD AUTO: 2.88 M/UL
SODIUM SERPL-SCNC: 129 MMOL/L
TOXIC GRANULES BLD QL SMEAR: PRESENT
WBC # BLD AUTO: 16.06 K/UL

## 2017-03-14 PROCEDURE — 25000242 PHARM REV CODE 250 ALT 637 W/ HCPCS: Performed by: EMERGENCY MEDICINE

## 2017-03-14 PROCEDURE — 25000003 PHARM REV CODE 250: Performed by: EMERGENCY MEDICINE

## 2017-03-14 PROCEDURE — 93005 ELECTROCARDIOGRAM TRACING: CPT

## 2017-03-14 PROCEDURE — 85027 COMPLETE CBC AUTOMATED: CPT

## 2017-03-14 PROCEDURE — 25000003 PHARM REV CODE 250: Performed by: HOSPITALIST

## 2017-03-14 PROCEDURE — 80053 COMPREHEN METABOLIC PANEL: CPT

## 2017-03-14 PROCEDURE — 63600175 PHARM REV CODE 636 W HCPCS: Performed by: EMERGENCY MEDICINE

## 2017-03-14 PROCEDURE — 84100 ASSAY OF PHOSPHORUS: CPT

## 2017-03-14 PROCEDURE — 25000003 PHARM REV CODE 250: Performed by: INTERNAL MEDICINE

## 2017-03-14 PROCEDURE — 94761 N-INVAS EAR/PLS OXIMETRY MLT: CPT

## 2017-03-14 PROCEDURE — 83735 ASSAY OF MAGNESIUM: CPT

## 2017-03-14 PROCEDURE — 94640 AIRWAY INHALATION TREATMENT: CPT

## 2017-03-14 PROCEDURE — 11000001 HC ACUTE MED/SURG PRIVATE ROOM

## 2017-03-14 PROCEDURE — 85007 BL SMEAR W/DIFF WBC COUNT: CPT

## 2017-03-14 PROCEDURE — 27000221 HC OXYGEN, UP TO 24 HOURS

## 2017-03-14 RX ORDER — ONDANSETRON 2 MG/ML
4 INJECTION INTRAMUSCULAR; INTRAVENOUS EVERY 12 HOURS PRN
Status: DISCONTINUED | OUTPATIENT
Start: 2017-03-14 | End: 2017-03-14

## 2017-03-14 RX ORDER — SODIUM CHLORIDE 9 MG/ML
INJECTION, SOLUTION INTRAVENOUS CONTINUOUS
Status: ACTIVE | OUTPATIENT
Start: 2017-03-14 | End: 2017-03-15

## 2017-03-14 RX ORDER — ADENOSINE 3 MG/ML
6 INJECTION, SOLUTION INTRAVENOUS
Status: COMPLETED | OUTPATIENT
Start: 2017-03-14 | End: 2017-03-14

## 2017-03-14 RX ORDER — IPRATROPIUM BROMIDE AND ALBUTEROL SULFATE 2.5; .5 MG/3ML; MG/3ML
3 SOLUTION RESPIRATORY (INHALATION) EVERY 4 HOURS
Status: DISCONTINUED | OUTPATIENT
Start: 2017-03-14 | End: 2017-03-15

## 2017-03-14 RX ORDER — RAMELTEON 8 MG/1
8 TABLET ORAL NIGHTLY PRN
Status: DISCONTINUED | OUTPATIENT
Start: 2017-03-14 | End: 2017-03-16 | Stop reason: HOSPADM

## 2017-03-14 RX ORDER — PANTOPRAZOLE SODIUM 40 MG/1
40 TABLET, DELAYED RELEASE ORAL DAILY
Status: DISCONTINUED | OUTPATIENT
Start: 2017-03-14 | End: 2017-03-14

## 2017-03-14 RX ORDER — ACETAMINOPHEN 325 MG/1
650 TABLET ORAL EVERY 6 HOURS PRN
Status: DISCONTINUED | OUTPATIENT
Start: 2017-03-14 | End: 2017-03-14

## 2017-03-14 RX ORDER — DONEPEZIL HYDROCHLORIDE 10 MG/1
10 TABLET, FILM COATED ORAL NIGHTLY
Status: DISCONTINUED | OUTPATIENT
Start: 2017-03-14 | End: 2017-03-16 | Stop reason: HOSPADM

## 2017-03-14 RX ORDER — HEPARIN SODIUM 5000 [USP'U]/ML
5000 INJECTION, SOLUTION INTRAVENOUS; SUBCUTANEOUS EVERY 12 HOURS
Status: DISCONTINUED | OUTPATIENT
Start: 2017-03-14 | End: 2017-03-16 | Stop reason: HOSPADM

## 2017-03-14 RX ORDER — ONDANSETRON 2 MG/ML
8 INJECTION INTRAMUSCULAR; INTRAVENOUS EVERY 8 HOURS PRN
Status: DISCONTINUED | OUTPATIENT
Start: 2017-03-14 | End: 2017-03-15

## 2017-03-14 RX ORDER — HEPARIN SODIUM 5000 [USP'U]/ML
5000 INJECTION, SOLUTION INTRAVENOUS; SUBCUTANEOUS EVERY 8 HOURS
Status: DISCONTINUED | OUTPATIENT
Start: 2017-03-14 | End: 2017-03-14

## 2017-03-14 RX ORDER — SODIUM CHLORIDE 0.9 % (FLUSH) 0.9 %
3 SYRINGE (ML) INJECTION EVERY 8 HOURS
Status: DISCONTINUED | OUTPATIENT
Start: 2017-03-14 | End: 2017-03-14

## 2017-03-14 RX ORDER — AMOXICILLIN 250 MG
1 CAPSULE ORAL 2 TIMES DAILY
Status: DISCONTINUED | OUTPATIENT
Start: 2017-03-14 | End: 2017-03-16 | Stop reason: HOSPADM

## 2017-03-14 RX ORDER — PANTOPRAZOLE SODIUM 40 MG/1
40 FOR SUSPENSION ORAL DAILY
Status: DISCONTINUED | OUTPATIENT
Start: 2017-03-14 | End: 2017-03-16 | Stop reason: HOSPADM

## 2017-03-14 RX ORDER — MEMANTINE HYDROCHLORIDE 10 MG/1
10 TABLET ORAL 2 TIMES DAILY
Status: DISCONTINUED | OUTPATIENT
Start: 2017-03-14 | End: 2017-03-16 | Stop reason: HOSPADM

## 2017-03-14 RX ORDER — LEVOTHYROXINE SODIUM 75 UG/1
75 TABLET ORAL
Status: DISCONTINUED | OUTPATIENT
Start: 2017-03-14 | End: 2017-03-16 | Stop reason: HOSPADM

## 2017-03-14 RX ORDER — FAMOTIDINE 20 MG/1
20 TABLET, FILM COATED ORAL DAILY
Status: DISCONTINUED | OUTPATIENT
Start: 2017-03-14 | End: 2017-03-14

## 2017-03-14 RX ORDER — ACETAMINOPHEN 160 MG/5ML
650 SOLUTION ORAL
Status: COMPLETED | OUTPATIENT
Start: 2017-03-14 | End: 2017-03-14

## 2017-03-14 RX ORDER — SODIUM CHLORIDE 9 MG/ML
INJECTION, SOLUTION INTRAVENOUS CONTINUOUS
Status: DISCONTINUED | OUTPATIENT
Start: 2017-03-14 | End: 2017-03-14

## 2017-03-14 RX ORDER — ACETAMINOPHEN 500 MG
500 TABLET ORAL EVERY 6 HOURS PRN
Status: DISCONTINUED | OUTPATIENT
Start: 2017-03-14 | End: 2017-03-16 | Stop reason: HOSPADM

## 2017-03-14 RX ORDER — LEVETIRACETAM 500 MG/1
500 TABLET ORAL 2 TIMES DAILY
Status: DISCONTINUED | OUTPATIENT
Start: 2017-03-14 | End: 2017-03-16 | Stop reason: HOSPADM

## 2017-03-14 RX ADMIN — PIPERACILLIN SODIUM AND TAZOBACTAM SODIUM 4.5 G: 4; .5 INJECTION, POWDER, LYOPHILIZED, FOR SOLUTION INTRAVENOUS at 07:03

## 2017-03-14 RX ADMIN — MEMANTINE 10 MG: 10 TABLET ORAL at 12:03

## 2017-03-14 RX ADMIN — LEVETIRACETAM 500 MG: 500 TABLET, FILM COATED ORAL at 08:03

## 2017-03-14 RX ADMIN — IPRATROPIUM BROMIDE AND ALBUTEROL SULFATE 3 ML: .5; 3 SOLUTION RESPIRATORY (INHALATION) at 11:03

## 2017-03-14 RX ADMIN — PIPERACILLIN SODIUM AND TAZOBACTAM SODIUM 4.5 G: 4; .5 INJECTION, POWDER, LYOPHILIZED, FOR SOLUTION INTRAVENOUS at 05:03

## 2017-03-14 RX ADMIN — HEPARIN SODIUM 5000 UNITS: 5000 INJECTION, SOLUTION INTRAVENOUS; SUBCUTANEOUS at 08:03

## 2017-03-14 RX ADMIN — SODIUM CHLORIDE: 0.9 INJECTION, SOLUTION INTRAVENOUS at 06:03

## 2017-03-14 RX ADMIN — DOCUSATE SODIUM AND SENNOSIDES 1 TABLET: 8.6; 5 TABLET, FILM COATED ORAL at 08:03

## 2017-03-14 RX ADMIN — IPRATROPIUM BROMIDE AND ALBUTEROL SULFATE 3 ML: .5; 3 SOLUTION RESPIRATORY (INHALATION) at 08:03

## 2017-03-14 RX ADMIN — SODIUM CHLORIDE 1000 ML: 0.9 INJECTION, SOLUTION INTRAVENOUS at 11:03

## 2017-03-14 RX ADMIN — PANTOPRAZOLE SODIUM 40 MG: 40 GRANULE, DELAYED RELEASE ORAL at 11:03

## 2017-03-14 RX ADMIN — ADENOSINE 6 MG: 3 INJECTION, SOLUTION INTRAVENOUS at 12:03

## 2017-03-14 RX ADMIN — IPRATROPIUM BROMIDE AND ALBUTEROL SULFATE 3 ML: .5; 3 SOLUTION RESPIRATORY (INHALATION) at 02:03

## 2017-03-14 RX ADMIN — DONEPEZIL HYDROCHLORIDE 10 MG: 10 TABLET, FILM COATED ORAL at 09:03

## 2017-03-14 RX ADMIN — LEVOTHYROXINE SODIUM 75 MCG: 75 TABLET ORAL at 07:03

## 2017-03-14 RX ADMIN — IPRATROPIUM BROMIDE AND ALBUTEROL SULFATE 3 ML: .5; 3 SOLUTION RESPIRATORY (INHALATION) at 04:03

## 2017-03-14 RX ADMIN — MEMANTINE 10 MG: 10 TABLET ORAL at 09:03

## 2017-03-14 RX ADMIN — DOCUSATE SODIUM AND SENNOSIDES 1 TABLET: 8.6; 5 TABLET, FILM COATED ORAL at 09:03

## 2017-03-14 RX ADMIN — SODIUM CHLORIDE: 0.9 INJECTION, SOLUTION INTRAVENOUS at 02:03

## 2017-03-14 RX ADMIN — HEPARIN SODIUM 5000 UNITS: 5000 INJECTION, SOLUTION INTRAVENOUS; SUBCUTANEOUS at 09:03

## 2017-03-14 RX ADMIN — PIPERACILLIN SODIUM AND TAZOBACTAM SODIUM 4.5 G: 4; .5 INJECTION, POWDER, LYOPHILIZED, FOR SOLUTION INTRAVENOUS at 10:03

## 2017-03-14 RX ADMIN — ACETAMINOPHEN 649.6 MG: 160 SOLUTION ORAL at 12:03

## 2017-03-14 RX ADMIN — LEVETIRACETAM 500 MG: 500 TABLET, FILM COATED ORAL at 09:03

## 2017-03-14 NOTE — SUBJECTIVE & OBJECTIVE
Past Medical History:   Diagnosis Date    Alzheimer disease     Cancer     thyroid    Chronic kidney disease, stage III (moderate)     Decubitus ulcer of sacral region, stage 4     Dyslipidemia     Essential hypertension, benign     History of thyroid cancer     Hypothyroidism     Seizure disorder     Seizures     Unspecified vitamin D deficiency        Past Surgical History:   Procedure Laterality Date    GASTROSTOMY TUBE PLACEMENT      THYROIDECTOMY         Review of patient's allergies indicates:  No Known Allergies    No current facility-administered medications on file prior to encounter.      Current Outpatient Prescriptions on File Prior to Encounter   Medication Sig    collagenase ointment Apply topically once daily.    donepezil (ARICEPT) 10 MG tablet Take 1 tablet (10 mg total) by mouth every evening.    gel base no.41, bulk, Gel 1 application by Misc.(Non-Drug; Combo Route) route 4 (four) times daily as needed.    levetiracetam (KEPPRA) 500 MG Tab Take 1 tablet (500 mg total) by mouth 2 (two) times daily.    levothyroxine (SYNTHROID) 75 MCG tablet Take 1 tablet (75 mcg total) by mouth before breakfast.    memantine (NAMENDA) 10 MG Tab Take 1 tablet (10 mg total) by mouth 2 (two) times daily.    triamcinolone acetonide 0.5% (KENALOG) 0.5 % Crea Apply a thin film to affected area twice daily for 7-14 days     Family History     Problem Relation (Age of Onset)    Alzheimer's disease Mother, ,     Hypertension Daughter        Social History Main Topics    Smoking status: Never Smoker    Smokeless tobacco: Never Used    Alcohol use No    Drug use: No    Sexual activity: Not Currently     Review of Systems   Unable to perform ROS: Patient nonverbal     Objective:     Vital Signs (Most Recent):  Temp: 100 °F (37.8 °C) (03/14/17 0321)  Pulse: (!) 134 (03/14/17 0536)  Resp: (!) 34 (03/14/17 0536)  BP: 118/66 (03/14/17 0536)  SpO2: 100 % (03/14/17 0536) Vital Signs (24h Range):  Temp:  [99  °F (37.2 °C)-102.1 °F (38.9 °C)] 100 °F (37.8 °C)  Pulse:  [116-153] 134  Resp:  [22-39] 34  SpO2:  [92 %-100 %] 100 %  BP: ()/(50-71) 118/66     Weight: 45.4 kg (100 lb)  Body mass index is 15.66 kg/(m^2).    Physical Exam   Constitutional: She appears well-developed. She appears distressed.   cachetic   HENT:   Head: Normocephalic and atraumatic.   Right Ear: External ear normal.   Left Ear: External ear normal.   Nose: Nose normal.   Eyes: Right eye exhibits no discharge. Left eye exhibits no discharge.   Cardiovascular:   Tachycardic without murmurs   Pulmonary/Chest:   tachypneic with relatively clear breath sounds   Abdominal:   Decreased bowel sounds, no tenderness nor distension   Musculoskeletal: Normal range of motion. She exhibits no edema.   Neurological:   Patient is nonverbal   Skin: Skin is warm and dry. She is not diaphoretic. No erythema.   Nursing note reviewed.       Significant Labs: All pertinent labs within the past 24 hours have been reviewed.    Significant Imaging: I have reviewed and interpreted all pertinent imaging results/findings within the past 24 hours.

## 2017-03-14 NOTE — H&P
[facility] Hospital Medicine  History & Physical    Patient Name: Mercy Pineda  MRN: [id number]  Admission Date: 3/13/2017  Attending Physician: Scotty Lynn MD   Primary Care Provider: Azikiwe K Lombard, MD         Patient information was obtained from ER records.     Subjective:     Principal Problem:Aspiration pneumonia of both lower lobes    Chief Complaint: Fevers for two days.    HPI: Mrs. Mercy Pineda is a 66 y.o. female known to me with CKD Stage III, hypothyroidism (.320 Dec 2016), epilepsy, dementia, PEG tube in place, anemia of chronic disease, severe protein-calorie malnutrition, and history of CVA who presents to [facility] [place] with complaints of fevers for the past two days.  Her daughter also reports that she has been short of breath.  Fevers at home were measured as high as 104 degrees Fahrenheit and was treated with acetaminophen.  EMS were activated and they measured her ambient air oxygen saturations to be 80%, after which she was placed on a Venturi mask at 5 liters/minute.  Further history is limited at this time.    Chart Review:  Previous Hospitalizations  Date Hospital Diagnosis   Dec 2016 [facility] Replacement of malfunctioning PEG tube   Aug 2016 [facility] Dehydration      Outpatient Follow-Up  Date of Visit Physician Service   Aug 2016 Azikiwe Lombard, MD Primary Care     Past Medical History:   Diagnosis Date    Alzheimer disease     Cancer     thyroid    Chronic kidney disease, stage III (moderate)     Decubitus ulcer of sacral region, stage 4     Dyslipidemia     Essential hypertension, benign     History of thyroid cancer     Hypothyroidism     Seizure disorder     Seizures     Unspecified vitamin D deficiency        Past Surgical History:   Procedure Laterality Date    GASTROSTOMY TUBE PLACEMENT      THYROIDECTOMY         Review of patient's allergies indicates:  No Known Allergies    No current facility-administered medications on file prior to  encounter.      Current Outpatient Prescriptions on File Prior to Encounter   Medication Sig    collagenase ointment Apply topically once daily.    donepezil (ARICEPT) 10 MG tablet Take 1 tablet (10 mg total) by mouth every evening.    gel base no.41, bulk, Gel 1 application by Misc.(Non-Drug; Combo Route) route 4 (four) times daily as needed.    levetiracetam (KEPPRA) 500 MG Tab Take 1 tablet (500 mg total) by mouth 2 (two) times daily.    levothyroxine (SYNTHROID) 75 MCG tablet Take 1 tablet (75 mcg total) by mouth before breakfast.    memantine (NAMENDA) 10 MG Tab Take 1 tablet (10 mg total) by mouth 2 (two) times daily.    triamcinolone acetonide 0.5% (KENALOG) 0.5 % Crea Apply a thin film to affected area twice daily for 7-14 days     Family History     Problem Relation (Age of Onset)    Alzheimer's disease Mother, ,     Hypertension Daughter        Social History Main Topics    Smoking status: Never Smoker    Smokeless tobacco: Never Used    Alcohol use No    Drug use: No    Sexual activity: Not Currently     Review of Systems   Unable to perform ROS: Patient nonverbal     Objective:     Vital Signs (Most Recent):  Temp: 100 °F (37.8 °C) (03/14/17 0321)  Pulse: (!) 134 (03/14/17 0536)  Resp: (!) 34 (03/14/17 0536)  BP: 118/66 (03/14/17 0536)  SpO2: 100 % (03/14/17 0536) Vital Signs (24h Range):  Temp:  [99 °F (37.2 °C)-102.1 °F (38.9 °C)] 100 °F (37.8 °C)  Pulse:  [116-153] 134  Resp:  [22-39] 34  SpO2:  [92 %-100 %] 100 %  BP: ()/(50-71) 118/66     Weight: 45.4 kg (100 lb)  Body mass index is 15.66 kg/(m^2).    Physical Exam   Constitutional: She appears well-developed. She appears distressed.   cachetic   HENT:   Head: Normocephalic and atraumatic.   Right Ear: External ear normal.   Left Ear: External ear normal.   Nose: Nose normal.   Eyes: Right eye exhibits no discharge. Left eye exhibits no discharge.   Cardiovascular:   Tachycardic without murmurs   Pulmonary/Chest:   tachypneic with  relatively clear breath sounds   Abdominal:   Decreased bowel sounds, no tenderness nor distension   Musculoskeletal: Normal range of motion. She exhibits no edema.   Neurological:   Patient is nonverbal   Skin: Skin is warm and dry. She is not diaphoretic. No erythema.   Nursing note reviewed.       Significant Labs: All pertinent labs within the past 24 hours have been reviewed.    Significant Imaging: I have reviewed and interpreted all pertinent imaging results/findings within the past 24 hours.    Assessment/Plan:     * Aspiration pneumonia of both lower lobes  Patient's symptoms were consistent with pneumonia, and her CT-chest is confirmatory.  The patient has a CURB-65 score of 4, and does not meet criteria for healthcare-associated pneumonia.  I have reviewed the chest X-ray and it reveals no infiltrates but her CT-A chest was significant for bilateral lower lobe consolidations consistent with aspiration.  Oxygen saturations are stable.  Blood and sputum cultures are pending.  Will start empiric antibiotic therapy.  She does meet criteria for severe sepsis.      Of note, patient was actually planned for hospice evaluation/admission.  She has a DNR status but is not yet hospice or palliative care.  Family has declined central venous access but would still like to provide aggressive therapy for the pneumonia.  They understand that she may be dying.      Hypothyroidism  Very poorly-controlled raising the question of whether she actually is taking the medication or whether it's taken appropriately.  Will continue her home regimen of levothyroxine.      Dementia  Her mentation appears to be worse than her baseline, but I think it's due to sepsis.  Will continue her home regimen of donepezil and memantine.    Epilepsy  Stable; will continue her home regimen of levetiracetam.      Severe sepsis  This patient meets criteria for severe sepsis given fever, tachycardia, tachypnea, bandemia of 36%, aspiration pneumonia,  and lactic acidosis.  Blood cultures are pending; will start IV fluid hydration and broad spectrum antibiotics.    Constipation  Her plain abdominal radiograph is consistent with constipation; will try bulking agents and escalate if necessary.     CKD Stage III  Her renal function is stable; will continue to monitor her urine output.    PEG tube in place  Stable; there are no acute issues.    Anemia of chronic disease  Her hemoglobin has decreased from 10.0 grams two months ago to 7.9 grams today; there is no evidence of acute bleeding.  There is no indication for transfusion at this time.  Will continue to monitor.    Severe protein-calorie malnutrition  Will provide protein supplementation with Boost Plus.    History of CVA (cerebrovascular accident)  Stable; there are no acute issues.    VTE Risk Mitigation         Ordered     heparin (porcine) injection 5,000 Units  Every 12 hours     Route:  Subcutaneous        03/14/17 0522     Medium Risk of VTE  Once      03/14/17 0522            Critical Care Time: 60 minutes.        Staci Reno M.D.  Staff Nocturnist  Department of Hospital Medicine  [facility]  Pager: [phone]

## 2017-03-14 NOTE — NURSING
Patient arrived to floor via stretcher. Non rebreather in place. Transported with 2 assist to bed. Cardiac monitor in place. Will continue to monitor patient.

## 2017-03-14 NOTE — CONSULTS
"Consult Note  Palliative Care      Consult Requested By: Danyell Jones MD  Reason for Consult:      EOL/hospice    SUBJECTIVE:     History of Present Illness:  CC: Shortness of breath     HPI: This 66 y.o. F, who has a past medical history of Alzheimer disease; Cancer; Chronic kidney disease, stage III (moderate); Decubitus ulcer of sacral region, stage 4; Dyslipidemia; Essential hypertension, benign; History of thyroid cancer; Hypothyroidism; Seizure disorder; Seizures; and Unspecified vitamin D deficiency, presents to the ED for evaluation of of shortness of breath since just pta. The patient's daughter heard some "junky" breathing and noticed the patient was laboring so she called EMS. Per EMS O2 sats were 84% on scene and improved to 90% en route with non-rebreather. She is not on oxygen at home. Daughter got a forehead temp of 104 yesterday and noticed congestion, rhinorrhea, bilateral eye tearing and 2 episodes of vomiting earlier today. She has been treating with tylenol. Daughter also notes the patient is more altered than usual. The patient is DNR. No know allergies. Hx is otherwise limited due to hx of alzheimer's.      The history is provided by the EMS personnel and a relative.     Palliative care has been consulted to discuss hospice and EOL.  Of note, patient is known to me and was seen during previous admissions on 16, and 17; and hospice was recommended on both occasions, but family did not want hospice.  At last admission, family was agreeable to seeing outpatient palliative NP Jami Damon.  By history patient's mother  at home from Alzheimer's and family declined hospice.             Past Medical History:   Diagnosis Date    Alzheimer disease     Cancer     thyroid    Chronic kidney disease, stage III (moderate)     Decubitus ulcer of sacral region, stage 4     Dyslipidemia     Essential hypertension, benign     History of thyroid cancer     Hypothyroidism     Seizure " "disorder     Seizures     Unspecified vitamin D deficiency      Past Surgical History:   Procedure Laterality Date    GASTROSTOMY TUBE PLACEMENT      THYROIDECTOMY       Family History   Problem Relation Age of Onset    Hypertension Daughter     Alzheimer's disease Mother     Alzheimer's disease       grandmother    Alzheimer's disease       aunt     Social History   Substance Use Topics    Smoking status: Never Smoker    Smokeless tobacco: Never Used    Alcohol use No       Mental Status:  Advanced dementia, nonverbal and does not follow commands.      Palliative Performance Score:  20 - on tube feedings/PEG      OBJECTIVE:     Pain Assessment/symptom management:  Patient unable to participate in assessment - severe advanced dementia, nonverbal.  However, she does moan very softly almost continuously.  Of note, she does not have any pain medication listed on her home meds.  See MAR for medication regimen.      Decision-Making Capacity:  Patient unable to speak on her own behalf.      Advanced Directives:  Living Will:  LaPOST dated 16  Do Not Resuscitate Status:  DNR  Medical Power of :    None on record.        Living Arrangements:  Home with family    Psychosocial, Spiritual, Cultural: Advanced dementia; nonverbal.     Patient's most important priorities:      Patient's biggest concerns/fears:      Previous death/end of life care history: Patient's   last week.  Family states the day of the  patient cried all day - this was new for her and they had not seen her cry like this in the past.        Patient's goals/hopes:      ASSESSMENT/PLAN:     Patient lying supine on her side, appears much much older than her stated age, cachectic with HT 5'7" and WT 45.4kg.  She is essentially nonverbal, except for near continuous soft moaning sound.  Does not follow commands  - advanced dementia.  She is on 100% NRB mask without acute distress.  Patient is terminal and appears to be " entering early phase of actively dying.      Lungs are coarse bilaterally.  Heart tones distant and regular.  Abdomen soft with hyperactive bowel sounds.  Emaciated extremities, no appreciable edema.    Daughter Erin Orellana at bedside with other relatives.  Had some reluctance to state what goal of care is.  Patient has been in debilitated poor condition for prolonged period of time and she is fairly used to seeing the patient in this condition.    Discussed option of hospice care and Dr. Jones's recommendation for inpatient hospice.  Family is adamant they do not want a facility.  They would want [facility] with the same nurse who does the home health for patient.  They state that person is hospice certified.  Explained we can guarantee that, but they can request it when they meet with hospice representative.      Patient is DNR status and the [place]POST document       Recommendations:   Continue supportive care for now.   HOME HOSPICE when family chooses (their preference will be [facility])   May benefit from very low dose morphine or other opiate for dyspnea and pain control.   Consult .   Mary dated 9/14/16 is valid.    Thank you for this consult and the opportunity to participate in Mrs. Pineda's care again.    Myrna Almeida, ELSYN, RN, CCRN   Palliative Care Nurse Coordinator   [facility]  [phone]       Time Spent:  60 minutes

## 2017-03-14 NOTE — ASSESSMENT & PLAN NOTE
Her mentation appears to be worse than her baseline, but I think it's due to sepsis.  Will continue her home regimen of donepezil and memantine.

## 2017-03-14 NOTE — TELEPHONE ENCOUNTER
----- Message from Gwen Ramos sent at 3/13/2017  4:41 PM CDT -----  Contact: Misti with [facility]  Patient's daughter is asking if she can have have Mucinex ? She is congested & has fever .     [phone]     LL

## 2017-03-14 NOTE — PROGRESS NOTES
"Pt arrived to MSU via stretcher from ED at 1508. No family present at bedside. - Pt daughter arrived at bedside at 1600, daughter agitated and raising voice and states "no one has been in my mothers room since they brought her up". Assured daughter that the nurse mavis Foss as well as myself had been in pt's room and that pt arrived to floor alone. Daughter insist that family/friend was with pt. Pt requesting to speak to MD, MD notified. Attempted to calm pt down, pt states "I just want to know what is going on and why they brought her here and not to ICU".   "

## 2017-03-14 NOTE — ASSESSMENT & PLAN NOTE
Her hemoglobin has decreased from 10.0 grams two months ago to 7.9 grams today; there is no evidence of acute bleeding.  There is no indication for transfusion at this time.  Will continue to monitor.

## 2017-03-14 NOTE — ASSESSMENT & PLAN NOTE
Patient's symptoms were consistent with pneumonia, and her CT-chest is confirmatory.  The patient has a CURB-65 score of 4, and does not meet criteria for healthcare-associated pneumonia.  I have reviewed the chest X-ray and it reveals no infiltrates but her CT-A chest was significant for bilateral lower lobe consolidations consistent with aspiration.  Oxygen saturations are stable.  Blood and sputum cultures are pending.  Will start empiric antibiotic therapy.  She does meet criteria for severe sepsis.      Of note, patient was actually planned for hospice evaluation/admission.  She has a DNR status but is not yet hospice or palliative care.  Family has declined central venous access but would still like to provide aggressive therapy for the pneumonia.  They understand that she may be dying.

## 2017-03-14 NOTE — ASSESSMENT & PLAN NOTE
This patient meets criteria for severe sepsis given fever, tachycardia, tachypnea, bandemia of 36%, aspiration pneumonia, and lactic acidosis.  Blood cultures are pending; will start IV fluid hydration and broad spectrum antibiotics.

## 2017-03-14 NOTE — PROGRESS NOTES
This TN and ALONSO Almeida RN; Palliative Care RN met with family to discuss goals of care and DC plan.  Daughter, Erin stated that she is the health care power of  and has the paper work in the car.  TN asked that she bring paper work on her next visit.    Present at the meeting was Erin, daughter-in-law, Missy and caregiver, Alcides.   Erin stated that she spoke with the home Palliative care RN, Jami Damon and decided on home hospice care.  She would like to remain with [facility] as they have been providing home health services and the family is pleased with the care the patient has received.  Options discussed inpatient vs home hospice.  Erin declined stating she did not want to cross the bridge and she would prefer to have the patient return home with hospice.    Dr Jones met with the family.  Family expressed the same wishes to Dr Jones.  TN will consult [facility] in am.

## 2017-03-14 NOTE — ED NOTES
"MD notified of positive blood cultures. Pt is receiving antibiotics, no further orders at this time. MD informed pt is DNR with palliative care. RN asked to downgrade to tele per ED charge RN request. Verbal order from  to continue to ICU. Wound care consult requested due to large sacral wound. Pt transferred to soft care mattress and kept clean and dry. Protonix tablet enteric coated and needs to be changed due to G tube. Dr. Jones to change to suspension. Dr. Jones states "I will be down in about 30 mins."   "

## 2017-03-14 NOTE — PLAN OF CARE
03/14/17 1737   Discharge Assessment   Assessment Type Discharge Planning Assessment   Confirmed/corrected address and phone number on facesheet? Yes   Assessment information obtained from? Caregiver  (daughter, Erni)   Expected Length of Stay (days) (undetermined at this time)   Communicated expected length of stay with patient/caregiver yes   Type of Healthcare Directive Received (Daughter stated that she has HPOA papers in her car.  She is to bring them in.)   If Healthcare Directive is received, is it scanned into Epic? no (comment)   Prior to hospitilization cognitive status: Not Oriented to Person;Not Oriented to Place;Not Oriented to Time   Prior to hospitalization functional status: Completely Dependent   Current cognitive status: Not Oriented to Person;Not Oriented to Place;Not Oriented to Time   Current Functional Status: Completely Dependent   Arrived From home health   Lives With child(sushil), adult   Able to Return to Prior Arrangements yes   Is patient able to care for self after discharge? No   How many people do you have in your home that can help with your care after discharge? >4   Who are your caregiver(s) and their phone number(s)? multi family members participate in patient's care   Patient's perception of discharge disposition hospice/home   Readmission Within The Last 30 Days no previous admission in last 30 days   Patient currently being followed by outpatient case management? No   Patient currently receives home health services? Yes   Patient previously received home health services and would like to resume services if necessary? Yes   If yes, name of home health provider: [facility]   Does the patient currently use HME? Yes   Name and contact number for HME provider: [facility]   Patient currently receives private duty nursing? No   Patient currently receives any other outside agency services? Yes   How many hours a day does the patient receive services? 5   Name and contact number of agency or  "person providing outside services [facility]   Is it the patient/care giver preference to resume care with the current outside agency? Yes   Equipment Currently Used at Home hospital bed   Do you have any problems affording any of your prescribed medications? No   Is the patient taking medications as prescribed? yes   Do you have any financial concerns preventing you from receiving the healthcare you need? No   Does the patient have transportation to healthcare appointments? Yes   Transportation Available family or friend will provide   On Dialysis? No   Does the patient receive services at the Coumadin Clinic? No   Discharge Plan A Hospice/home   Patient/Family In Agreement With Plan yes     [employer] [id number] [facility] [address] [address] [address] [place] [address]  Phone: [phone] Fax: [phone]      TN/SW roll explained to pt.  TN's name and contact info placed on white board.  Pt/family encouraged to call for any problems/concerns with DC. "Discharge planning begins on Admission" pamphlet discussed and placed in "My Health Packet" and placed at bedside..    "

## 2017-03-14 NOTE — ASSESSMENT & PLAN NOTE
Her plain abdominal radiograph is consistent with constipation; will try bulking agents and escalate if necessary.

## 2017-03-14 NOTE — PROGRESS NOTES
Please note the following patient has been assigned to  Tatiana Camp RN with Outpatient Complex Care Mgmt for screening.    Please contact [facility] at ext [phone] with questions.    Thank you    Ramona Harris, SSC

## 2017-03-14 NOTE — ED PROVIDER NOTES
"Encounter Date: 3/13/2017    SCRIBE #1 NOTE: I, Andre Ferguson, am scribing for, and in the presence of,  Scotty Lynn MD. I have scribed the following portions of the note - Other sections scribed: ROS, HPI.       History     Chief Complaint   Patient presents with    Shortness of Breath     Pt reports shortness of breath x 2 days.  EMS reports pt is tachypneic and tachycardic and feels warm to the touch.       Review of patient's allergies indicates:  No Known Allergies  HPI Comments: CC: Shortness of breath    HPI: This 66 y.o. F, who has a past medical history of Alzheimer disease; Cancer; Chronic kidney disease, stage III (moderate); Decubitus ulcer of sacral region, stage 4; Dyslipidemia; Essential hypertension, benign; History of thyroid cancer; Hypothyroidism; Seizure disorder; Seizures; and Unspecified vitamin D deficiency, presents to the ED for evaluation of of shortness of breath since just pta. The patient's daughter heard some "junky" breathing and noticed the patient was laboring so she called EMS. Per EMS O2 sats were 84% on scene and improved to 90% en route with non-rebreather. She is not on oxygen at home. Daughter got a forehead temp of 104 yesterday and noticed congestion, rhinorrhea, bilateral eye tearing and 2 episodes of vomiting earlier today. She has been treating with tylenol. Daughter also notes the patient is more altered than usual. The patient is DNR. No know allergies. Hx is otherwise limited due to hx of alzheimer's.     The history is provided by the EMS personnel and a relative.     Past Medical History:   Diagnosis Date    Alzheimer disease     Cancer     thyroid    Chronic kidney disease, stage III (moderate)     Decubitus ulcer of sacral region, stage 4     Dyslipidemia     Essential hypertension, benign     History of thyroid cancer     Hypothyroidism     Seizure disorder     Seizures     Unspecified vitamin D deficiency      Past Surgical History:   Procedure " Laterality Date    GASTROSTOMY TUBE PLACEMENT      THYROIDECTOMY       Family History   Problem Relation Age of Onset    Hypertension Daughter     Alzheimer's disease Mother     Alzheimer's disease       grandmother    Alzheimer's disease       aunt     Social History   Substance Use Topics    Smoking status: Never Smoker    Smokeless tobacco: Never Used    Alcohol use No     Review of Systems   Unable to perform ROS: Dementia   Constitutional: Positive for fever.   HENT: Positive for congestion and rhinorrhea.    Eyes: Positive for discharge (tearing).   Respiratory: Positive for shortness of breath.    Gastrointestinal: Positive for vomiting.       Physical Exam   Initial Vitals   BP Pulse Resp Temp SpO2   03/13/17 1754 03/13/17 1754 03/13/17 1754 -- 03/13/17 1754   115/67 143 30  96 %     Physical Exam    Constitutional: She appears well-developed and well-nourished. She appears lethargic.  Non-toxic appearance. She does not have a sickly appearance. She does not appear ill.   She is more lethargic than usual   HENT:   Head: Normocephalic and atraumatic.   Eyes: EOM are normal.   Neck: Neck supple.   Cardiovascular: Regular rhythm. Tachycardia present.    Pulmonary/Chest: Tachypnea noted. She has rales.   Abdominal: Soft. Bowel sounds are normal. There is no tenderness.   She a feeding tube in LUQ   Neurological: She appears lethargic.   She has contractures   Skin: Skin is warm and dry. No rash noted.   Psychiatric:   She is nonverbal with a hx of Alzheimer's         ED Course   Critical Care  Date/Time: 3/14/2017 12:54 AM  Performed by: ARCHIE HANEY  Authorized by: ARCHIE HANEY   Direct patient critical care time: 35 minutes  Additional history critical care time: 10 minutes  Ordering / reviewing critical care time: 15 minutes  Documentation critical care time: 10 minutes  Consulting other physicians critical care time: 15 minutes  Total critical care time (exclusive of procedural time) : 85  minutes  Critical care was necessary to treat or prevent imminent or life-threatening deterioration of the following conditions: circulatory failure, shock and sepsis.  Critical care was time spent personally by me on the following activities: ordering and performing treatments and interventions, pulse oximetry, evaluation of patient's response to treatment, examination of patient, ordering and review of laboratory studies, obtaining history from patient or surrogate, ordering and review of radiographic studies and re-evaluation of patient's condition.        Labs Reviewed   CBC W/ AUTO DIFFERENTIAL - Abnormal; Notable for the following:        Result Value    RBC 3.24 (*)     Hemoglobin 7.9 (*)     Hematocrit 25.8 (*)     MCV 80 (*)     MCH 24.4 (*)     MCHC 30.6 (*)     RDW 16.1 (*)     Lymph% 3.0 (*)     All other components within normal limits   COMPREHENSIVE METABOLIC PANEL - Abnormal; Notable for the following:     Sodium 127 (*)     Chloride 94 (*)     Albumin 1.3 (*)     AST 58 (*)     All other components within normal limits   LACTIC ACID, PLASMA - Abnormal; Notable for the following:     Lactate (Lactic Acid) 2.3 (*)     All other components within normal limits   URINALYSIS - Abnormal; Notable for the following:     Appearance, UA Hazy (*)     Protein, UA 1+ (*)     Occult Blood UA 2+ (*)     Urobilinogen, UA 4.0-6.0 (*)     All other components within normal limits   URINALYSIS MICROSCOPIC - Abnormal; Notable for the following:     RBC, UA 8 (*)     All other components within normal limits   CULTURE, BLOOD   CULTURE, BLOOD   B-TYPE NATRIURETIC PEPTIDE   TROPONIN I   INFLUENZA A AND B ANTIGEN   VANCOMYCIN, RANDOM   POCT OCCULT BLOOD STOOL     EKG Readings: (Independently Interpreted)   Initial Reading: No STEMI. Rhythm: Sinus Tachycardia. Heart Rate: 143.   Taken at 17:28          Medical Decision Making:   History:   I obtained history from: someone other than patient.       <> Summary of History:  EMS, Daughter  Old Medical Records: I decided to obtain old medical records.  Clinical Tests:   Lab Tests: Ordered and Reviewed  The following lab test(s) were unremarkable: BNP, Troponin, CBC, CMP, Urinalysis and Lactate       <> Summary of Lab: Blood cultures  Radiological Study: Ordered and Reviewed  Medical Tests: Ordered and Reviewed  ED Management:  This is an emergent evaluation of a critically ill patient.    The patient is a 66-year-old demented, nonverbal, and contracted female who arrives for respiratory distress, fever, and hypoxia.  Her sats improved on nonrebreather.  Per EMS, she was treated with a DuoNeb.  She was afebrile at presentation.  A chest x-ray was negative for pneumonia.  Labs revealed a normal white blood cell count but a bandemia of 36.  Given her recent fevers, coarse breath sounds, and bandemia, I suspect pneumonia.  I ordered a CAT scan of the chest to assess for the presence of pneumonia in addition to further assess the lungs.  It was positive for bilateral aspiration pneumonia.  She was initially treated with Rocephin and azithromycin for community for pneumonia, but then her treatment was changed to think and Zosyn.    The patient's heart rate was initially consistent with tachycardia with a heart rate of 130.  The heart rate improved to 115 after IV fluids.  Later on her stay, she began to have tachycardia.  Multiple EKGs were performed and adenosine was given administered to find the underlying rhythm.  There are PE, QRS, and T waves present.  This is consistent with sinus tachycardia.  She was treated IV fluids.  She is also received Tylenol for a fever that she spiked during her ED stay.    The patient will be admitted to the hospital medicine service for IV antibiotics and respiratory therapy for her hypoxia.       0237:  Pt is hypotensive.  Discussed a CVL with her donovanerErin (PO[place]ON[place]).  She is deciding now.      0309:  Spoke to daughter and son, uGillermo, for a long time about  options to do CVL or continue current treatment with aggressive IVF and abx.  They would like to proceed with current plan and decline the CVL for now.  They understand that CVL is best option but want to be reasonable and avoid invasive procedures in their mother who is likely terminal.  (there were plans to start hospice before this)    Pt will now be admitted to ICU.            Scribe Attestation:   Scribe #1: I performed the above scribed service and the documentation accurately describes the services I performed. I attest to the accuracy of the note.    Attending Attestation:           Physician Attestation for Scribe:  Physician Attestation Statement for Scribe #1: I, Scotty Lynn MD, reviewed documentation, as scribed by Andre Ferguson in my presence, and it is both accurate and complete.                 ED Course     Clinical Impression:   The primary encounter diagnosis was Aspiration pneumonia of both lower lobes, unspecified aspiration pneumonia type. A diagnosis of Septic shock was also pertinent to this visit.    Disposition:   Disposition: Admitted  Condition: Stable       Scotty Lynn MD  03/14/17 0312

## 2017-03-14 NOTE — PROGRESS NOTES
Pt on nonrebreather sats 100%the patient hasd coarse crackles bilaterally.Pt tolerated respiratory treatment with IVA.

## 2017-03-14 NOTE — PROGRESS NOTES
PALLIATIVE CARE PROGRESS NOTE:    Spoke to patient's daughter, Erin Orellana ([phone]) and set up meeting for when she arrives at the hospital later today - about 4:14 p.m.       Myrna Almeida, BSN, RN, CCRN   Palliative Care Nurse Coordinator   [facility]  [phone]

## 2017-03-14 NOTE — ASSESSMENT & PLAN NOTE
Very poorly-controlled raising the question of whether she actually is taking the medication or whether it's taken appropriately.  Will continue her home regimen of levothyroxine.

## 2017-03-14 NOTE — PROGRESS NOTES
Pt seen and examined, and I agree with Dr. Reno's assessment and plan. Will continue current care outlined in the H&P with the following addition:    Pt with + blood cultures reported this morning, and she is on the appropriate antibiotic regimen which will be continued for now. Pt is cachetic with advanced dementia with sepsis and aspiration pneumonia. Pt's prognosis is poor and I have recommended inpatient hospice. I have spoken with the daughter, (Erin, [phone]) and I have updated her on her mother's status and discussed goals of care. Will continue current treatment for now and she will meet with Palliative care later today to discuss inpatient hospice.    Addendum ([phone]):  Spoke with daughter at the bedside, they wish to pursue home hospice. Will continue current care for now with no escalation and plan for home hospice in the morning.      ALESHAI Jones M.D.   Hospitalist   [facility]   Department of Hospital Medicine   Pager: [phone]

## 2017-03-15 PROCEDURE — 25000003 PHARM REV CODE 250: Performed by: INTERNAL MEDICINE

## 2017-03-15 PROCEDURE — 63600175 PHARM REV CODE 636 W HCPCS: Performed by: EMERGENCY MEDICINE

## 2017-03-15 PROCEDURE — 11000001 HC ACUTE MED/SURG PRIVATE ROOM

## 2017-03-15 PROCEDURE — 25000242 PHARM REV CODE 250 ALT 637 W/ HCPCS: Performed by: EMERGENCY MEDICINE

## 2017-03-15 PROCEDURE — 94640 AIRWAY INHALATION TREATMENT: CPT

## 2017-03-15 PROCEDURE — 25000003 PHARM REV CODE 250: Performed by: HOSPITALIST

## 2017-03-15 PROCEDURE — 25000003 PHARM REV CODE 250: Performed by: EMERGENCY MEDICINE

## 2017-03-15 PROCEDURE — 27000221 HC OXYGEN, UP TO 24 HOURS

## 2017-03-15 PROCEDURE — 25000242 PHARM REV CODE 250 ALT 637 W/ HCPCS: Performed by: HOSPITALIST

## 2017-03-15 RX ORDER — MORPHINE SULFATE ORAL SOLUTION 10 MG/5ML
5 SOLUTION ORAL
Status: DISCONTINUED | OUTPATIENT
Start: 2017-03-15 | End: 2017-03-16 | Stop reason: HOSPADM

## 2017-03-15 RX ORDER — MORPHINE SULFATE ORAL SOLUTION 10 MG/5ML
5 SOLUTION ORAL
Status: DISCONTINUED | OUTPATIENT
Start: 2017-03-15 | End: 2017-03-15

## 2017-03-15 RX ORDER — ONDANSETRON HYDROCHLORIDE 4 MG/5ML
4 SOLUTION ORAL EVERY 6 HOURS PRN
Status: DISCONTINUED | OUTPATIENT
Start: 2017-03-15 | End: 2017-03-16 | Stop reason: HOSPADM

## 2017-03-15 RX ORDER — IPRATROPIUM BROMIDE AND ALBUTEROL SULFATE 2.5; .5 MG/3ML; MG/3ML
3 SOLUTION RESPIRATORY (INHALATION)
Status: DISCONTINUED | OUTPATIENT
Start: 2017-03-15 | End: 2017-03-16 | Stop reason: HOSPADM

## 2017-03-15 RX ORDER — MORPHINE SULFATE ORAL SOLUTION 10 MG/5ML
5 SOLUTION ORAL
Qty: 100 ML | Refills: 0 | Status: SHIPPED | OUTPATIENT
Start: 2017-03-15

## 2017-03-15 RX ORDER — ACETAMINOPHEN 500 MG
500 TABLET ORAL EVERY 6 HOURS PRN
Refills: 0 | COMMUNITY
Start: 2017-03-15

## 2017-03-15 RX ORDER — LORAZEPAM 2 MG/ML
0.5 CONCENTRATE ORAL EVERY 8 HOURS PRN
Status: DISCONTINUED | OUTPATIENT
Start: 2017-03-15 | End: 2017-03-16 | Stop reason: HOSPADM

## 2017-03-15 RX ORDER — SCOLOPAMINE TRANSDERMAL SYSTEM 1 MG/1
1 PATCH, EXTENDED RELEASE TRANSDERMAL
Status: DISCONTINUED | OUTPATIENT
Start: 2017-03-15 | End: 2017-03-16 | Stop reason: HOSPADM

## 2017-03-15 RX ORDER — LORAZEPAM 2 MG/ML
0.5 CONCENTRATE ORAL EVERY 8 HOURS PRN
Qty: 1 ML | Refills: 0 | Status: SHIPPED | OUTPATIENT
Start: 2017-03-15 | End: 2017-04-14

## 2017-03-15 RX ADMIN — PIPERACILLIN SODIUM AND TAZOBACTAM SODIUM 4.5 G: 4; .5 INJECTION, POWDER, LYOPHILIZED, FOR SOLUTION INTRAVENOUS at 04:03

## 2017-03-15 RX ADMIN — MEMANTINE 10 MG: 10 TABLET ORAL at 10:03

## 2017-03-15 RX ADMIN — IPRATROPIUM BROMIDE AND ALBUTEROL SULFATE 3 ML: .5; 3 SOLUTION RESPIRATORY (INHALATION) at 01:03

## 2017-03-15 RX ADMIN — MORPHINE SULFATE 5 MG: 10 SOLUTION ORAL at 04:03

## 2017-03-15 RX ADMIN — PANTOPRAZOLE SODIUM 40 MG: 40 GRANULE, DELAYED RELEASE ORAL at 08:03

## 2017-03-15 RX ADMIN — LEVOTHYROXINE SODIUM 75 MCG: 75 TABLET ORAL at 06:03

## 2017-03-15 RX ADMIN — VANCOMYCIN HYDROCHLORIDE 750 MG: 750 INJECTION, POWDER, LYOPHILIZED, FOR SOLUTION INTRAVENOUS at 02:03

## 2017-03-15 RX ADMIN — HEPARIN SODIUM 5000 UNITS: 5000 INJECTION, SOLUTION INTRAVENOUS; SUBCUTANEOUS at 10:03

## 2017-03-15 RX ADMIN — LEVETIRACETAM 500 MG: 500 TABLET, FILM COATED ORAL at 10:03

## 2017-03-15 RX ADMIN — IPRATROPIUM BROMIDE AND ALBUTEROL SULFATE 3 ML: .5; 3 SOLUTION RESPIRATORY (INHALATION) at 09:03

## 2017-03-15 RX ADMIN — HEPARIN SODIUM 5000 UNITS: 5000 INJECTION, SOLUTION INTRAVENOUS; SUBCUTANEOUS at 08:03

## 2017-03-15 RX ADMIN — DONEPEZIL HYDROCHLORIDE 10 MG: 10 TABLET, FILM COATED ORAL at 10:03

## 2017-03-15 RX ADMIN — MEMANTINE 10 MG: 10 TABLET ORAL at 08:03

## 2017-03-15 RX ADMIN — DOCUSATE SODIUM AND SENNOSIDES 1 TABLET: 8.6; 5 TABLET, FILM COATED ORAL at 10:03

## 2017-03-15 RX ADMIN — PIPERACILLIN SODIUM AND TAZOBACTAM SODIUM 4.5 G: 4; .5 INJECTION, POWDER, LYOPHILIZED, FOR SOLUTION INTRAVENOUS at 06:03

## 2017-03-15 RX ADMIN — LEVETIRACETAM 500 MG: 500 TABLET, FILM COATED ORAL at 08:03

## 2017-03-15 RX ADMIN — MORPHINE SULFATE 5 MG: 10 SOLUTION ORAL at 11:03

## 2017-03-15 RX ADMIN — SCOPALAMINE 1.5 MG: 1 PATCH, EXTENDED RELEASE TRANSDERMAL at 11:03

## 2017-03-15 RX ADMIN — IPRATROPIUM BROMIDE AND ALBUTEROL SULFATE 3 ML: .5; 3 SOLUTION RESPIRATORY (INHALATION) at 12:03

## 2017-03-15 RX ADMIN — IPRATROPIUM BROMIDE AND ALBUTEROL SULFATE 3 ML: .5; 3 SOLUTION RESPIRATORY (INHALATION) at 03:03

## 2017-03-15 RX ADMIN — DOCUSATE SODIUM AND SENNOSIDES 1 TABLET: 8.6; 5 TABLET, FILM COATED ORAL at 08:03

## 2017-03-15 NOTE — PROGRESS NOTES
Nutren 2.0 tube feed started at 10 ml/hr. No residual, nausea/vomitted noted or reported by daughter.    Dressing changed to bilat heels, sacrum and bilat hips per order.

## 2017-03-15 NOTE — PLAN OF CARE
Problem: Patient Care Overview  Goal: Plan of Care Review  Outcome: Ongoing (interventions implemented as appropriate)  Pt has remained free from injury this shift. Pt has been changed and positioned every two hours. Due to pts family refusing turns, pt mobility and nutrition, Pt remains at high risk for  Tissue injury.

## 2017-03-15 NOTE — SUBJECTIVE & OBJECTIVE
Interval History: Pt with no acute issues overnight, moaning occasionally. NRB in place.    Review of Systems   Unable to perform ROS: Dementia     Objective:     Vital Signs (Most Recent):  Temp: 98.3 °F (36.8 °C) (03/15/17 1716)  Pulse: 99 (03/15/17 1716)  Resp: 18 (03/15/17 1716)  BP: (!) 93/48 (03/15/17 1716)  SpO2: 100 % (03/15/17 1716) Vital Signs (24h Range):  Temp:  [97.7 °F (36.5 °C)-98.3 °F (36.8 °C)] 98.3 °F (36.8 °C)  Pulse:  [] 99  Resp:  [12-20] 18  SpO2:  [92 %-100 %] 100 %  BP: ()/(48-79) 93/48     Weight: 45.4 kg (100 lb)  Body mass index is 15.66 kg/(m^2).  No intake or output data in the 24 hours ending 03/15/17 1806   Physical Exam   Constitutional: She appears well-developed. She appears distressed.   Cachetic.   HENT:   Head: Normocephalic and atraumatic.   Eyes: EOM are normal. Pupils are equal, round, and reactive to light. Right eye exhibits no discharge. Left eye exhibits no discharge.   Neck: Normal range of motion.   Cardiovascular: Normal rate and regular rhythm.    Tachycardic without murmurs   Pulmonary/Chest:   Tachypneic with course breath sounds, R > L.    Abdominal:   Decreased bowel sounds, no tenderness nor distension, PEG in place.   Musculoskeletal: Normal range of motion. She exhibits no edema.   Contracted and wasted extremities.   Neurological:   Patient is nonverbal, responds to tactile stimuli.   Skin: Skin is warm and dry. She is not diaphoretic. No erythema.   Stage IV decubitus ulcer.   Nursing note reviewed.      Significant Labs: All pertinent labs within the past 24 hours have been reviewed.    Significant Imaging: I have reviewed and interpreted all pertinent imaging results/findings within the past 24 hours.

## 2017-03-15 NOTE — PROGRESS NOTES
SW contacted [facility] to determine if services will be provided. No answered, SW left a message.

## 2017-03-15 NOTE — PLAN OF CARE
Recommendations     Recommendation/Intervention:  1. Noted comfort care measures   2. Should family decide to resume TF of Nutren 2.0 to goal of 35 ml/hr + 180 ml fluid flushes q 4 hrs adequate to meet needs   3. RD to monitor     Goals: Comfort care  Nutrition Goal Status: new  Communication of RD Recs: reviewed with RN     Continuum of Care Plan     Referral to Outpatient Services: (D/C planning: Comfort care)

## 2017-03-15 NOTE — PLAN OF CARE
[facility]     Department of Hospital Medicine     [address] [place] [address]     [phone] after hours  [phone] fax                                   HOSPICE  ORDERS     03/15/2017    Admit to Hospice:  Home Service    Diagnoses:  Active Hospital Problems    Diagnosis  POA    *Severe sepsis [A41.9, R65.20]  Yes    Pressure ulcer of right buttock, stage 4 [L89.314]  Yes     Priority: 2     Cachexia [R64]  Yes     Priority: 4     Epilepsy [G40.909]  Yes     Priority: 5      Chronic    Hypothyroidism [E03.9]  Yes     Priority: 7      Chronic    DNR (do not resuscitate) [Z66]  Yes     Priority: 10     Constipation [K59.00]  Yes    CKD Stage III [N18.3]  Yes     Chronic    PEG tube in place [Z93.1]  Not Applicable     Chronic    Anemia of chronic disease [D63.8]  Yes     Chronic    Severe protein-calorie malnutrition [E43]  Yes     Chronic    History of CVA (cerebrovascular accident) [Z86.73]  Not Applicable     Chronic    Aspiration pneumonia of both lower lobes [J69.0]  Yes    Dementia [F03.90]  Yes     Chronic    History of thyroidectomy [E89.0]  Not Applicable    Unspecified vitamin D deficiency [E55.9]  Yes      Resolved Hospital Problems    Diagnosis Date Resolved POA    Dementia of the Alzheimer's type [G30.9, F02.80] 03/14/2017 Yes     Priority: 3      Chronic    CKD Stage III [N18.3] 09/02/2016 Yes     Chronic       Hospice Qualifying Diagnoses: Severe sepsis, cachexia, severe malnutrition, end stage dementia, aspiration pneurmonia       Patient has a life expectancy < 6 months due to these conditions.    Vital Signs: Routine per Hospice Protocol.    Allergies:Review of patient's allergies indicates:  No Known Allergies    Diet: NPO. All medications and tube feedings per PEG.  Nutren 2.0 @ goal rate 35 mls/hr continuous via PEG. Hold TF x 4 hrs for residuals >250 mls. Flush 180 mls Q4 hrs to meet fluid needs.     CONSULTS:   Aide  to provide assistance with personal care, ADLs, and vital signs.     MISCELLANEOUS CARE:  PEG Care: Instruct patient/caregiver to clean site. Monitor skin integrity.     WOUND CARE ORDERS  Local wound care to sacrum with Santyl and NS gauze. Local wound care to bilateral hips and left heel with hydrogel + gauze dressings. Wound care daily.  Local wound care to right foot with dry gauze daily.   Continue Pressure Injury Prevention Interventions with frequent pressure shifts.      Activities: As tolerated    Nursing: Per Hospice Routine    Future Orders:  Hospice Medical Director may dictate new orders for comfortable care measures & sign death certificate.    Medications:         Comfort Care Medications Only     [facility]   Home Medication Instructions DELL:[id number]    Printed on:03/15/17 1127   Medication Information                      acetaminophen (TYLENOL) 500 MG tablet  1 tablet (500 mg total) by Per G Tube route every 6 (six) hours as needed.             collagenase ointment  Apply topically once daily.             donepezil (ARICEPT) 10 MG tablet  Take 1 tablet (10 mg total) by mouth every evening.             gel base no.41, bulk, Gel  1 application by Misc.(Non-Drug; Combo Route) route 4 (four) times daily as needed.             levetiracetam (KEPPRA) 500 MG Tab  Take 1 tablet (500 mg total) by mouth 2 (two) times daily.             levothyroxine (SYNTHROID) 75 MCG tablet  Take 1 tablet (75 mcg total) by mouth before breakfast.             lorazepam 2 mg/ml oral conc (ATIVAN) 2 mg/mL Conc  0.25 mLs (0.5 mg total) by Per G Tube route every 8 (eight) hours as needed (Distress).             memantine (NAMENDA) 10 MG Tab  Take 1 tablet (10 mg total) by mouth 2 (two) times daily.             morphine 10 mg/5 mL solution  2.5 mLs (5 mg total) by Per G Tube route every hour as needed for Pain (Any signs of distress and air hunger).             triamcinolone acetonide 0.5% (KENALOG) 0.5 % Crea  Apply a  thin film to affected area twice daily for 7-14 days                 Code status: DNR    _________________________________  Danyell Jones MD  03/15/2017

## 2017-03-15 NOTE — NURSING
Pt has been changed every two hours with family refusing turns. Family allows slight changes. Pt is a DNR and shows signs of pain while being adjusted. Will be addressed this a.m. Will continue to monitor.

## 2017-03-15 NOTE — ASSESSMENT & PLAN NOTE
Bacteremia  Assessment and Plan:  This patient meets criteria for severe sepsis given fever, tachycardia, tachypnea, bandemia of 36%, aspiration pneumonia, and lactic acidosis.  Blood cultures are ; will start IV fluid hydration and broad spectrum antibiotics.

## 2017-03-15 NOTE — PROGRESS NOTES
Per John with [facility], they have accepted pt, however, they will not be able to accept pt until tomorrow morning. Per John, they will not be able to get all equipment delivered today after they speak with family. Per John she will contact family to determine when they are able to start delivering equipment to the home.

## 2017-03-15 NOTE — PHYSICIAN QUERY
"PT Name: [facility]  MR #: [id number]    Physician Query Form -Respiratory Condition Clarification    Reviewer  Ext [phone] Lizet Hazel    This form is a permanent document in the medical record.    Query Date: March 15, 2017    By submitting this query, we are merely seeking further clarification of documentation. Please utilize your independent clinical judgment when addressing the question(s) below.  (The Medical record reflects the following:)   Indicators   Supporting Clinical Findings Location in Medical Record   x "Wheezing", "Productive cough", "SOB", "ARAGON", "Use of accessory muscles" documented Pt on nonrebreather sats 100%the patient hasd coarse crackles bilaterally.Pt tolerated respiratory treatment with IVA.    SOB  Congestion  Rhinorrhea  Vomiting  Eyes: tearing  Lethargic  Rales   RRT  3/14        ED MD Note    Chest X-Ray =      x "Hypoxia" documented Hypoxia   ED MD Note   x Respiratory Distress or Failure documented  arrives in respiratory distress, fever and hypoxia    She appears distressed ED MD Note    H&P   x RR=        PaO2=      PaCO2=      O2 sat= RR: 30 - 34 - 20   Tachypnea    HR: 143 - 134 - 113 - 88  Tachycardia   VS Flow Sheet   x Treatment: The patient will be admitted to the hospital medicine service for IV antibiotics and respiratory therapy for her hypoxia.     She is  on 100% NRB mask without acute distress. ED MD Note      [facility] 3/14    BiPAP/Intubation     x Supplemental O2/Home O2: Not on oxygen at home    5 lpm on VM in ED from NRB by EMS  100% NRB Mask on 3/14 - 3/15   ED MD Note    Oxygen dependence      Other: Sepsis  Aspiration PNA  Advanced dementia  Cachectic    EMS reported that pt was 80% SPO2 sat on room air upon their arrival. EMS administered 5 L venimask. Lung sounds are wet and course. Pt is late stage dementia, is contracted, aphasic, and is disoriented x 4. Primary PN 3/14            EDNN 3/13 @ 625pm   Provider, please specify diagnosis or diagnoses " associated with above clinical findings.    [ x ] Acute Respiratory Failure  [  ] No Respiratory Failure  [  ] Other Respiratory Diagnosis (Specify): _________________________  [  ] Clinically Undetermined    Please document in your progress notes daily for the duration of treatment, until resolved, and include in your discharge summary.

## 2017-03-15 NOTE — PROGRESS NOTES
Referral sent to [facility] hospice via [facility].  Awaiting return response.

## 2017-03-15 NOTE — PROGRESS NOTES
[facility]  Adult Nutrition  Consult Note    SUMMARY     Recommendations    Recommendation/Intervention:  1. Noted comfort care measures   2. Should family decide to resume TF of Nutren 2.0 to goal of 35 ml/hr + 180 ml fluid flushes q 4 hrs adequate to meet needs   3. RD to monitor    Goals: Comfort care  Nutrition Goal Status: new  Communication of RD Recs: reviewed with RN    Continuum of Care Plan    Referral to Outpatient Services:  (D/C planning: Comfort care)    Reason for Assessment    Reason for Assessment: identified at risk by screening criteria  Diagnosis: infection/sepsis  Relevent Medical History: Stage IV, Stage III, Ca; PEG; Dementia   Interdisciplinary Rounds: did not attend     General Information Comments: Noted Hospice/Comfort Care measures. TF orders written for Nutren 2.0 @ 35 ml/hr + 180 ml q 4 hrs. in d/c summary. Medications for comfort only.    Nutrition Prescription Ordered    Current Diet Order: NPO  Nutrition Order Comments: TF on hold at this time.     Evaluation of Received Nutrients/Fluid Intake     % Intake of Estimated Needs:0-25%   % Intake of meals: NPO/TF on hold      Nutrition Risk Screen     Nutrition Risk Screen: tube feeding or parenteral nutrition    Nutrition/Diet History     Typical Food/Fluid Intake: NPO  Food Preferences: DANNI     Labs/Tests/Procedures/Meds     Pertinent Labs Reviewed: reviewed  Pertinent Medications Reviewed: reviewed     Physical Findings    Overall Physical Appearance: generalized wasting  Tubes: gastrostomy tube  Oral/Mouth Cavity: tooth/teeth missing  Skin: pressure ulcer(s) (Stage III x 2; Stage IV x1; unstageable x 1)    Anthropometrics     Height (inches): 67.01 in  Weight Method: Estimated  Weight (kg): 45.4 kg     Ideal Body Weight (IBW), Female: 135.05 lb     % Ideal Body Weight, Female (lb): 74.11 lb  BMI (kg/m2): 15.67  BMI Grade: less than 16 protein-energy malnutrition grade III      Weight Loss: unintentional      Estimated/Assessed Needs    Weight Used For Calorie Calculations: 45.4 kg (100 lb 1.4 oz)   Height (cm): 170.2 cm     Energy Need Method: Kcal/kg (5994-5233 kcal)     RMR (T[place]-St. Jeor Equation): 1041.42      Weight Used For Protein Calculations: 45.4 kg (100 lb 1.4 oz)  Protein Requirements: 70 g    Fluid Need Method: RDA Method        Nutrition Diagnosis    Problem: Inadequate Energy Intake  Etiology: Comfort care measures  As Evidenced by: Hospice/TF on hold  Nutrition Diagnosis: NEw     Monitor and Evaluation    Food and Nutrient Intake: energy intake, enteral nutrition intake  Food and Nutrient Adminstration: diet order, enteral and parenteral nutrition administration  Anthropometric Measurements: weight, weight change  Biochemical Data, Medical Tests and Procedures: electrolyte and renal panel, glucose/endocrine profile  Nutrition-Focused Physical Findings: overall appearance, skin    Nutrition Risk    Level of Risk:  (1 x week)    Nutrition Follow-Up    RD Follow-up?: Yes    Assessment and Plan    No new Assessment & Plan notes have been filed under this hospital service since the last note was generated.  Service: Nutrition

## 2017-03-15 NOTE — CONSULTS
A 66 year old female admitted 3/13/17 from home with aspiration pneumonia of both lower lobes; septic shock  PMH: Alzheimer's disease; thyroid cancer; CKD Stage 3; sacral pressure injury Stage 4; dyslipidemia; HTN; seizure disorder; Vit D deficiency; S/P PEG tube; S/P Thyroidectomy  3/14 WBC 16.06 Hgb 7.1 Hct 22.7 Alb 1.2  On Isolibrium mattress  Patient to discharge home with [facility].   To continue treatment plan with Santyl to sacrum with NS gauze; hydrogel + gauze to bilateral hip and left heel wounds; and dry gauze to right foot wound. Continue Pressure Injury Prevention Interventions with frequent pressure shifts.   Plan: Assess wounds prior to discharge home.

## 2017-03-16 ENCOUNTER — OUTPATIENT CASE MANAGEMENT (OUTPATIENT)
Dept: ADMINISTRATIVE | Facility: OTHER | Age: 67
End: 2017-03-16

## 2017-03-16 VITALS
WEIGHT: 100 LBS | TEMPERATURE: 100 F | SYSTOLIC BLOOD PRESSURE: 103 MMHG | HEIGHT: 67 IN | DIASTOLIC BLOOD PRESSURE: 60 MMHG | RESPIRATION RATE: 18 BRPM | BODY MASS INDEX: 15.7 KG/M2 | HEART RATE: 108 BPM | OXYGEN SATURATION: 100 %

## 2017-03-16 LAB
BACTERIA BLD CULT: NORMAL

## 2017-03-16 PROCEDURE — 25000003 PHARM REV CODE 250: Performed by: HOSPITALIST

## 2017-03-16 PROCEDURE — 25000003 PHARM REV CODE 250: Performed by: EMERGENCY MEDICINE

## 2017-03-16 PROCEDURE — 25000242 PHARM REV CODE 250 ALT 637 W/ HCPCS: Performed by: HOSPITALIST

## 2017-03-16 PROCEDURE — 94640 AIRWAY INHALATION TREATMENT: CPT

## 2017-03-16 PROCEDURE — 27000221 HC OXYGEN, UP TO 24 HOURS

## 2017-03-16 PROCEDURE — 25000003 PHARM REV CODE 250: Performed by: INTERNAL MEDICINE

## 2017-03-16 PROCEDURE — 63600175 PHARM REV CODE 636 W HCPCS: Performed by: EMERGENCY MEDICINE

## 2017-03-16 PROCEDURE — 94761 N-INVAS EAR/PLS OXIMETRY MLT: CPT

## 2017-03-16 RX ORDER — SODIUM CHLORIDE 9 MG/ML
INJECTION, SOLUTION INTRAVENOUS CONTINUOUS
Status: DISCONTINUED | OUTPATIENT
Start: 2017-03-16 | End: 2017-03-16 | Stop reason: HOSPADM

## 2017-03-16 RX ADMIN — DOCUSATE SODIUM AND SENNOSIDES 1 TABLET: 8.6; 5 TABLET, FILM COATED ORAL at 10:03

## 2017-03-16 RX ADMIN — PANTOPRAZOLE SODIUM 40 MG: 40 GRANULE, DELAYED RELEASE ORAL at 10:03

## 2017-03-16 RX ADMIN — LEVETIRACETAM 500 MG: 500 TABLET, FILM COATED ORAL at 10:03

## 2017-03-16 RX ADMIN — LEVOTHYROXINE SODIUM 75 MCG: 75 TABLET ORAL at 06:03

## 2017-03-16 RX ADMIN — HEPARIN SODIUM 5000 UNITS: 5000 INJECTION, SOLUTION INTRAVENOUS; SUBCUTANEOUS at 10:03

## 2017-03-16 RX ADMIN — MEMANTINE 10 MG: 10 TABLET ORAL at 10:03

## 2017-03-16 RX ADMIN — PIPERACILLIN SODIUM AND TAZOBACTAM SODIUM 4.5 G: 4; .5 INJECTION, POWDER, LYOPHILIZED, FOR SOLUTION INTRAVENOUS at 06:03

## 2017-03-16 RX ADMIN — SODIUM CHLORIDE: 0.9 INJECTION, SOLUTION INTRAVENOUS at 02:03

## 2017-03-16 RX ADMIN — PIPERACILLIN SODIUM AND TAZOBACTAM SODIUM 4.5 G: 4; .5 INJECTION, POWDER, LYOPHILIZED, FOR SOLUTION INTRAVENOUS at 12:03

## 2017-03-16 RX ADMIN — IPRATROPIUM BROMIDE AND ALBUTEROL SULFATE 3 ML: .5; 3 SOLUTION RESPIRATORY (INHALATION) at 07:03

## 2017-03-16 NOTE — PROGRESS NOTES
Evaluated Ms Pineda prior to discharge to home hospice. Vitals reviewed and patient examined. Soto catheter added for comfort and for frequent urination + pressure ulcers. Already in place. Ok for transport. Please view discharge summary for full details.

## 2017-03-16 NOTE — NURSING
Received call from Rocio Morgan in lab.  States 2 of 2 blood culture bottles positive for MRSA and 1 of 2 bottles positive for Proteus.  Will pass information on to RN in charge of patient's care.     1201:  Informed Dr. Starkey of positive blood culture results. No new orders given.

## 2017-03-16 NOTE — CONSULTS
Patient ready for discharge home. Nursing changed dressings to multiple pressure injuries. Discussed pressure injuries with nursing. States wounds necrotic. Noted dressings clean and intact to buttocks area and bilateral feet. Nursing inserted catheter for home. PEG tube clamped. Feet suspended off bed with pillow. Lower extremity flexion contractures. Noted Sundance boots and foam wedge with personal belongs for transport home. [facility] arrived for transport home.  Sof-care mattress on top of Isoflex mattress. Informed  that Sof care mattress can be used to transfer patient to stretcher.

## 2017-03-16 NOTE — PLAN OF CARE
03/16/17 1029   Final Note   Assessment Type Final Discharge Note   Discharge Disposition HospiceHome   What phone number can be called within the next 1-3 days to see how you are doing after discharge? [phone]   Hospital Follow Up  Appt(s) scheduled? (No appointments needed pt going home with hospice. )   Offered [facility] -- Bedside Delivery? n/a   Discharge/Hospital Encounter Summary to ([facility]) PCP n/a   Referral to / orders for Home Health Complete? n/a   30 day supply of medicines given at discharge, if documented non-compliance / non-adherence? n/a   Any social issues identified prior to discharge? n/a   Did you assess the readiness or willingness of the family or caregiver to support self management of care? Yes   Right Care Referral Info   Post Acute Recommendation Other   Referral Type Hospice   Facility Name [facility]      BIMAL contacted John with [facility] to determine if pt equipment has been delivered. Per John the equipment is on its way to pt home and pt is cleared to discharge for hospice. BIMAL informed pt family BIMAL has received the okay to transfer pt home. BIMAL arranged transportation with Daryl @ [phone], BIMAL spoke with Mila, and per Leanna Brito will be her within the hour. BIMAL notified pt nurse Mitchell of new discharge information. BIMAL also informed pt family transportation will be here within the hour. Pt travel packet has been placed by pt blue folder. BIMAL will notify nurse.

## 2017-03-16 NOTE — DISCHARGE SUMMARY
[facility] Hospital Medicine  Discharge Summary      Patient Name: Mercy Pineda  MRN: [id number]  Admission Date: 3/13/2017  Hospital Length of Stay: 3 days  Discharge Date and Time:  03/16/2017 1:36 AM  Attending Physician: Danyell Jones MD   Discharging Provider: Danyell Jones MD  Primary Care Provider: Azikiwe K Lombard, MD      HPI:   Mrs. Mercy Pineda is a 66 y.o. female known to me with CKD Stage III, hypothyroidism (.320 Dec 2016), epilepsy, dementia, PEG tube in place, anemia of chronic disease, severe protein-calorie malnutrition, and history of CVA who presents to [facility] ED with complaints of fevers for the past two days.  Her daughter also reports that she has been short of breath.  Fevers at home were measured as high as 104 degrees Fahrenheit and was treated with acetaminophen.  EMS were activated and they measured her ambient air oxygen saturations to be 80%, after which she was placed on a Venturi mask at 5 liters/minute.  Further history is limited at this time.    * No surgery found *      Indwelling Lines/Drains at time of discharge:   Lines/Drains/Airways     Drain                 Gastrostomy/Enterostomy 09/02/16 1441 Percutaneous endoscopic gastrostomy (PEG) LUQ feeding 194 days         Gastrostomy/Enterostomy 12/09/16 1313 Percutaneous endoscopic gastrostomy (PEG) midline 96 days          Pressure Ulcer                 Pressure Ulcer 08/29/16 2300 Left buttocks Stage  days         Pressure Ulcer 08/29/16 2300 Right hip Stage I 198 days         Pressure Ulcer 08/30/16 0800 Left ischial tuberosity Stage  days         Pressure Ulcer 01/10/17 0200 Left hip Unstageable 64 days         Pressure Ulcer 01/10/17 0200 Right  Stage IV 64 days         Pressure Ulcer 01/10/17 0837 Left lateral Stage I 64 days         Pressure Ulcer 01/10/17 0846 Right lateral other (see comments) suspected deep tissue injury 64 days         Pressure Ulcer 01/10/17 0856 Right ear,  right Stage I 64 days         Pressure Ulcer 01/11/17 0200 Left heel 63 days         Pressure Ulcer 01/11/17 0200 Right lateral foot 63 days         Pressure Ulcer 03/14/17 1524 Left heel Unstageable 1 day         Pressure Ulcer 03/14/17 1525 Right heel Stage III 1 day         Pressure Ulcer 03/14/17 1526 Left hip 1 day         Pressure Ulcer 03/14/17 1526 Right buttocks Stage IV 1 day              Hospital Course:   Ms. Pineda was admitted with severe sepsis with bacteremia due to aspiration pneumonia. Pt was empirically treated with broad spectrum IV antibiotics and IVF. Central line placement was discussed with family given possible need for pressor support but this was declined by the family. Pt responded to IVF and BP stabilized and patient was admitted to the floor. Pt has a chronic stage 4 decubitus, cachexia, and advanced dementia with continued decline for some time. Overall prognosis was poor and this was discussed with family along with goals for care. Palliative care followed. Given her poor prognosis, family elected for comfort care but wished to have antibiotics continued while she was in the hospital. Pt was made comfortable and arranged for home hospice.      Consults:   Consults         Status Ordering Provider     Inpatient consult to Palliative Care  Once     Provider:  Myrna Almeida RN    Completed PATRICIA CADENA          Significant Diagnostic Studies: CXR with infiltrate    Pending Diagnostic Studies:     None        Final Active Diagnoses:    Diagnosis Date Noted POA    PRINCIPAL PROBLEM:  Severe sepsis [A41.9, R65.20] 03/14/2017 Yes    Aspiration pneumonia of both lower lobes [J69.0] 03/13/2017 Yes    Cachexia [R64] 01/11/2017 Yes    Epilepsy [G40.909] 08/30/2016 Yes     Chronic    Pressure ulcer of right buttock, stage 4 [L89.314]  Yes    Hypothyroidism [E03.9] 10/18/2012 Yes     Chronic    DNR (do not resuscitate) [Z66] 01/12/2017 Yes    Constipation [K59.00] 03/14/2017 Yes     CKD Stage III [N18.3] 03/14/2017 Yes     Chronic    PEG tube in place [Z93.1] 03/14/2017 Not Applicable     Chronic    Anemia of chronic disease [D63.8] 03/14/2017 Yes     Chronic    Severe protein-calorie malnutrition [E43] 03/14/2017 Yes     Chronic    History of CVA (cerebrovascular accident) [Z86.73] 03/14/2017 Not Applicable     Chronic    Dementia [F03.90] 11/06/2012 Yes     Chronic    History of thyroidectomy [E89.0] 11/06/2012 Not Applicable    Unspecified vitamin D deficiency [E55.9] 10/18/2012 Yes      Problems Resolved During this Admission:    Diagnosis Date Noted Date Resolved POA    Dementia of the Alzheimer's type [G30.9, F02.80] 11/06/2012 03/14/2017 Yes     Chronic    CKD Stage III [N18.3] 08/30/2016 09/02/2016 Yes     Chronic        Discharged Condition: poor    Disposition: Hospice/Home    Follow Up:  Follow-up Information     Follow up with MD with home hospice In 1 day.        Patient Instructions:     Diet general   Order Comments: NPO.     Activity as tolerated       Medications:  Reconciled Home Medications:   Current Discharge Medication List      START taking these medications    Details   acetaminophen (TYLENOL) 500 MG tablet 1 tablet (500 mg total) by Per G Tube route every 6 (six) hours as needed.  Refills: 0      lorazepam 2 mg/ml oral conc (ATIVAN) 2 mg/mL Conc 0.25 mLs (0.5 mg total) by Per G Tube route every 8 (eight) hours as needed (Distress).  Qty: 1 mL, Refills: 0      morphine 10 mg/5 mL solution 2.5 mLs (5 mg total) by Per G Tube route every hour as needed for Pain (Any signs of distress and air hunger).  Qty: 100 mL, Refills: 0         CONTINUE these medications which have NOT CHANGED    Details   collagenase ointment Apply topically once daily.  Qty: 90 g, Refills: 0      donepezil (ARICEPT) 10 MG tablet Take 1 tablet (10 mg total) by mouth every evening.  Qty: 30 tablet, Refills: 5    Associated Diagnoses: Alzheimer's dementia without behavioral disturbance,  Alzheimer's disease of unspecified onset      gel base no.41, bulk, Gel 1 application by Misc.(Non-Drug; Combo Route) route 4 (four) times daily as needed.  Qty: 500 g, Refills: 11    Comments: Hydrogel or the equivalent      levetiracetam (KEPPRA) 500 MG Tab Take 1 tablet (500 mg total) by mouth 2 (two) times daily.  Qty: 60 tablet, Refills: 0    Associated Diagnoses: Seizure disorder      levothyroxine (SYNTHROID) 75 MCG tablet Take 1 tablet (75 mcg total) by mouth before breakfast.  Qty: 30 tablet, Refills: 5    Associated Diagnoses: Acquired hypothyroidism      memantine (NAMENDA) 10 MG Tab Take 1 tablet (10 mg total) by mouth 2 (two) times daily.  Qty: 60 tablet, Refills: 5      triamcinolone acetonide 0.5% (KENALOG) 0.5 % Crea Apply a thin film to affected area twice daily for 7-14 days  Qty: 30 g, Refills: 0    Associated Diagnoses: Rash           Time spent on the discharge of patient: 45 minutes    Danyell Jones MD  Department of Hospital Medicine  [facility]

## 2017-03-16 NOTE — PROGRESS NOTES
Attempt initial assessment. Pt is being discharged today to home with hospice. Will call tomorrow to complete initial assessment.

## 2017-03-16 NOTE — NURSING
Soto catheter inserted using sterile technique, Bed bath completed, wound care provided, pt transported to home with hospice via ambulance escorted by two persons and accompanied by sister, no complaints voiced at this time.

## 2017-03-16 NOTE — PROGRESS NOTES
PALLIATIVE CARE PROGRESS NOTE:    Discussed with BIMAL John, plan is for patient to discharge today with home hospice care.    ELSY VannN, RN, CCRN   Palliative Care Nurse Coordinator   [facility]  [phone]

## 2017-03-16 NOTE — PLAN OF CARE
Problem: Fall Risk (Adult)  Goal: Identify Related Risk Factors and Signs and Symptoms  Related risk factors and signs and symptoms are identified upon initiation of Human Response Clinical Practice Guideline (CPG)   Outcome: Ongoing (interventions implemented as appropriate)  Patient sleeping most of shift and open eyes spontaneously.  .  Patient doesn't appear to be in pain.  Patient  medicated with IV antibiotics and tolerated tube feeding well at 30 cc/hr.    Patient IV site cdi.    Patient wound dressings  intact with dry drainage noted..   Patient remained fall free throughout night.  Hourly rounding conducted to ensure safety. Will continue to monitor.

## 2017-03-16 NOTE — PHYSICIAN QUERY
PT Name: Mercy Pineda  MR #: [id number]     Physician Query Form - Documentation Clarification    Reviewer  Ext [phone]  Lizet Hazel    This form is a permanent document in the medical record.     Query Date: March 16, 2017  By submitting this query, we are merely seeking further clarification of documentation to reflect the severity of illness of your patient. Please utilize your independent clinical judgment when addressing the question(s) below.    (The Medical record reflects the following:)      Supporting Clinical Findings Location in Medical Record   Soto cath added for comfort and for frequent urination + pressure ulcers     Primary Pn 3/16   Pressure Ulcer 03/14/17 1524 Left heel Unstageable 1 day       Pressure Ulcer 03/14/17 1525 Right heel Stage III 1 day      Pressure Ulcer 03/14/17 1526 Left hip 1 day     DC Summ 3/16   On Isolibrium mattress  Patient to discharge home with [facility].   To continue treatment plan with Santyl to sacrum with NS gauze; hydrogel + gauze to bilateral hip and left heel wounds; and dry gauze to right foot wound. Continue Pressure Injury Prevention Interventions with frequent pressure shifts.     Nursing changed dressings to multiple pressure injuries. Discussed pressure injuries with nursing. States wounds necrotic. Noted dressings clean and intact to buttocks area and bilateral feet   Wound Care CN   Dressing changed to bilat heels, sacrum and bilat hips per orders    Nurse PN 3/15                                 2 part question.                                                              # 1 of 2: Please specify POA Status.                  L Heel unstageable ulcer:  POA status: ( X )yes; (  )no; (  )undeterminable                 R Heel Stage 3 Ulcer: POA status: ( X )yes; (  )no; (  )undeterminable    # 2 of 2:  Please specify Stage and POA status of L Hip Ulcer.                   POA status of L Hip Ulcer: ( X ) yes;  (  ) no;  (  ) undeterminable                   Stage of L Hip Ulcer: (  ) Stage 1; (  ) Stage 2; (  ) Stage 3;                                                   (  ) Stage 4; (  ) Stage 5; (  X) Unstageable;                                                   (  ) Undeterminable      Physician Use Only                                                                                                                               [  ] Unable to determine

## 2017-03-17 ENCOUNTER — OUTPATIENT CASE MANAGEMENT (OUTPATIENT)
Dept: ADMINISTRATIVE | Facility: OTHER | Age: 67
End: 2017-03-17

## 2017-03-17 NOTE — PROGRESS NOTES
Spoke to pt's daughter, Erin. She reports pt has been admitted to [facility]. All needs are being addressed.